# Patient Record
Sex: FEMALE | Race: WHITE
[De-identification: names, ages, dates, MRNs, and addresses within clinical notes are randomized per-mention and may not be internally consistent; named-entity substitution may affect disease eponyms.]

---

## 2019-09-05 ENCOUNTER — HOSPITAL ENCOUNTER (INPATIENT)
Dept: HOSPITAL 88 - ER | Age: 74
LOS: 13 days | Discharge: OTHER INSTITUTION | DRG: 870 | End: 2019-09-18
Attending: INTERNAL MEDICINE | Admitting: INTERNAL MEDICINE
Payer: MEDICARE

## 2019-09-05 VITALS — SYSTOLIC BLOOD PRESSURE: 113 MMHG | DIASTOLIC BLOOD PRESSURE: 78 MMHG

## 2019-09-05 VITALS — SYSTOLIC BLOOD PRESSURE: 116 MMHG | DIASTOLIC BLOOD PRESSURE: 72 MMHG

## 2019-09-05 VITALS — SYSTOLIC BLOOD PRESSURE: 111 MMHG | DIASTOLIC BLOOD PRESSURE: 77 MMHG

## 2019-09-05 VITALS — SYSTOLIC BLOOD PRESSURE: 128 MMHG | DIASTOLIC BLOOD PRESSURE: 92 MMHG

## 2019-09-05 VITALS — SYSTOLIC BLOOD PRESSURE: 145 MMHG | DIASTOLIC BLOOD PRESSURE: 94 MMHG

## 2019-09-05 VITALS — SYSTOLIC BLOOD PRESSURE: 140 MMHG | DIASTOLIC BLOOD PRESSURE: 94 MMHG

## 2019-09-05 VITALS — DIASTOLIC BLOOD PRESSURE: 63 MMHG | SYSTOLIC BLOOD PRESSURE: 105 MMHG

## 2019-09-05 VITALS — SYSTOLIC BLOOD PRESSURE: 129 MMHG | DIASTOLIC BLOOD PRESSURE: 87 MMHG

## 2019-09-05 VITALS — SYSTOLIC BLOOD PRESSURE: 123 MMHG | DIASTOLIC BLOOD PRESSURE: 60 MMHG

## 2019-09-05 VITALS — WEIGHT: 153.44 LBS | BODY MASS INDEX: 28.24 KG/M2 | HEIGHT: 62 IN

## 2019-09-05 VITALS — DIASTOLIC BLOOD PRESSURE: 90 MMHG | SYSTOLIC BLOOD PRESSURE: 123 MMHG

## 2019-09-05 VITALS — DIASTOLIC BLOOD PRESSURE: 60 MMHG | SYSTOLIC BLOOD PRESSURE: 123 MMHG

## 2019-09-05 VITALS — DIASTOLIC BLOOD PRESSURE: 77 MMHG | SYSTOLIC BLOOD PRESSURE: 111 MMHG

## 2019-09-05 VITALS — SYSTOLIC BLOOD PRESSURE: 150 MMHG | DIASTOLIC BLOOD PRESSURE: 84 MMHG

## 2019-09-05 DIAGNOSIS — Z16.12: ICD-10-CM

## 2019-09-05 DIAGNOSIS — E87.1: ICD-10-CM

## 2019-09-05 DIAGNOSIS — J30.2: ICD-10-CM

## 2019-09-05 DIAGNOSIS — I34.0: ICD-10-CM

## 2019-09-05 DIAGNOSIS — R56.9: ICD-10-CM

## 2019-09-05 DIAGNOSIS — A41.9: Primary | ICD-10-CM

## 2019-09-05 DIAGNOSIS — E78.5: ICD-10-CM

## 2019-09-05 DIAGNOSIS — G92: ICD-10-CM

## 2019-09-05 DIAGNOSIS — N17.9: ICD-10-CM

## 2019-09-05 DIAGNOSIS — I67.1: ICD-10-CM

## 2019-09-05 DIAGNOSIS — I48.91: ICD-10-CM

## 2019-09-05 DIAGNOSIS — K21.9: ICD-10-CM

## 2019-09-05 DIAGNOSIS — N10: ICD-10-CM

## 2019-09-05 DIAGNOSIS — B96.20: ICD-10-CM

## 2019-09-05 DIAGNOSIS — E87.6: ICD-10-CM

## 2019-09-05 DIAGNOSIS — B37.49: ICD-10-CM

## 2019-09-05 DIAGNOSIS — J18.9: ICD-10-CM

## 2019-09-05 DIAGNOSIS — I13.0: ICD-10-CM

## 2019-09-05 DIAGNOSIS — D75.82: ICD-10-CM

## 2019-09-05 DIAGNOSIS — E46: ICD-10-CM

## 2019-09-05 DIAGNOSIS — E79.0: ICD-10-CM

## 2019-09-05 DIAGNOSIS — E88.09: ICD-10-CM
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
## 2019-09-05 DIAGNOSIS — I82.612: ICD-10-CM

## 2019-09-05 DIAGNOSIS — E11.22: ICD-10-CM
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
## 2019-09-05 DIAGNOSIS — E83.51: ICD-10-CM

## 2019-09-05 DIAGNOSIS — I50.23: ICD-10-CM

## 2019-09-05 DIAGNOSIS — J44.9: ICD-10-CM

## 2019-09-05 DIAGNOSIS — N18.3: ICD-10-CM

## 2019-09-05 DIAGNOSIS — L89.152: ICD-10-CM

## 2019-09-05 DIAGNOSIS — Z79.82: ICD-10-CM

## 2019-09-05 DIAGNOSIS — I63.512: ICD-10-CM

## 2019-09-05 DIAGNOSIS — K72.00: ICD-10-CM

## 2019-09-05 DIAGNOSIS — E87.2: ICD-10-CM

## 2019-09-05 LAB
ALBUMIN SERPL-MCNC: 3 G/DL (ref 3.5–5)
ALBUMIN/GLOB SERPL: 0.8 {RATIO} (ref 0.8–2)
ALP SERPL-CCNC: 169 IU/L (ref 40–150)
ALT SERPL-CCNC: 168 IU/L (ref 0–55)
ANION GAP SERPL CALC-SCNC: 21.7 MMOL/L (ref 8–16)
ANISOCYTOSIS BLD QL SMEAR: (no result)
BACTERIA URNS QL MICRO: (no result) /HPF
BASOPHILS # BLD AUTO: 0 10*3/UL (ref 0–0.1)
BASOPHILS NFR BLD AUTO: 0.1 % (ref 0–1)
BILIRUB UR QL: NEGATIVE
BUN SERPL-MCNC: 78 MG/DL (ref 7–26)
BUN/CREAT SERPL: 26 (ref 6–25)
BURR CELLS BLD QL SMEAR: (no result)
CALCIUM SERPL-MCNC: 8.9 MG/DL (ref 8.4–10.2)
CHLORIDE SERPL-SCNC: 91 MMOL/L (ref 98–107)
CK MB SERPL-MCNC: 2.7 NG/ML (ref 0–5)
CK MB SERPL-MCNC: 4.7 NG/ML (ref 0–5)
CK MB SERPL-MCNC: 5.2 NG/ML (ref 0–5)
CK SERPL-CCNC: 105 IU/L (ref 29–168)
CK SERPL-CCNC: 87 IU/L (ref 29–168)
CK SERPL-CCNC: 88 IU/L (ref 29–168)
CLARITY UR: CLEAR
CO2 SERPL-SCNC: 19 MMOL/L (ref 22–29)
COLOR UR: YELLOW
CREAT UR-MCNC: 55.09 MG/DL (ref 47–110)
DEPRECATED INR PLAS: 1.47
DEPRECATED NEUTROPHILS # BLD AUTO: 11 10*3/UL (ref 2.1–6.9)
DEPRECATED RBC URNS MANUAL-ACNC: (no result) /HPF (ref 0–5)
EGFRCR SERPLBLD CKD-EPI 2021: 15 ML/MIN (ref 60–?)
EOSINOPHIL # BLD AUTO: 0 10*3/UL (ref 0–0.4)
EOSINOPHIL NFR BLD AUTO: 0.1 % (ref 0–6)
EPI CELLS URNS QL MICRO: (no result) /LPF
ERYTHROCYTE [DISTWIDTH] IN CORD BLOOD: 17.3 % (ref 11.7–14.4)
GLOBULIN PLAS-MCNC: 3.6 G/DL (ref 2.3–3.5)
GLUCOSE SERPLBLD-MCNC: 193 MG/DL (ref 74–118)
HCT VFR BLD AUTO: 36.5 % (ref 34.2–44.1)
HGB BLD-MCNC: 12.3 G/DL (ref 12–16)
KETONES UR QL STRIP.AUTO: NEGATIVE
LEUKOCYTE ESTERASE UR QL STRIP.AUTO: (no result)
LYMPHOCYTES # BLD: 1.5 10*3/UL (ref 1–3.2)
LYMPHOCYTES NFR BLD AUTO: 11 % (ref 18–39.1)
LYMPHOCYTES NFR BLD MANUAL: 16 % (ref 19–48)
MACROCYTES BLD QL SMEAR: (no result)
MCH RBC QN AUTO: 34.6 PG (ref 28–32)
MCHC RBC AUTO-ENTMCNC: 33.7 G/DL (ref 31–35)
MCV RBC AUTO: 102.5 FL (ref 81–99)
MONOCYTES # BLD AUTO: 0.8 10*3/UL (ref 0.2–0.8)
MONOCYTES NFR BLD AUTO: 5.5 % (ref 4.4–11.3)
MONOCYTES NFR BLD MANUAL: 10 % (ref 3.4–9)
NEUTS BAND NFR BLD MANUAL: 1 %
NEUTS SEG NFR BLD AUTO: 80.9 % (ref 38.7–80)
NEUTS SEG NFR BLD MANUAL: 73 % (ref 40–74)
NITRITE UR QL STRIP.AUTO: NEGATIVE
NRBC/RBC NFR BLD MANUAL: 5 %
PLAT MORPH BLD: NORMAL
PLATELET # BLD AUTO: 198 X10E3/UL (ref 140–360)
PLATELET # BLD EST: ADEQUATE 10*3/UL
POIKILOCYTOSIS BLD QL SMEAR: (no result)
POLYCHROMASIA BLD QL SMEAR: (no result)
POTASSIUM SERPL-SCNC: 4.7 MMOL/L (ref 3.5–5.1)
PROT UR QL STRIP.AUTO: (no result)
PROT UR-MCNC: 9.9 MG/DL (ref 1–14)
PROTHROMBIN TIME: 18.4 SECONDS (ref 11.9–14.5)
RBC # BLD AUTO: 3.56 X10E6/UL (ref 3.6–5.1)
RBC MORPH BLD: (no result)
SODIUM SERPL-SCNC: 127 MMOL/L (ref 136–145)
SODIUM UR-SCNC: < 20 MMOL/L
SP GR UR STRIP: >=1.03 (ref 1.01–1.02)
UROBILINOGEN UR STRIP-MCNC: 1 MG/DL (ref 0.2–1)
WBC #/AREA URNS HPF: (no result) /HPF (ref 0–5)

## 2019-09-05 PROCEDURE — 36415 COLL VENOUS BLD VENIPUNCTURE: CPT

## 2019-09-05 PROCEDURE — 86022 PLATELET ANTIBODIES: CPT

## 2019-09-05 PROCEDURE — 94640 AIRWAY INHALATION TREATMENT: CPT

## 2019-09-05 PROCEDURE — 86850 RBC ANTIBODY SCREEN: CPT

## 2019-09-05 PROCEDURE — 76604 US EXAM CHEST: CPT

## 2019-09-05 PROCEDURE — 95819 EEG AWAKE AND ASLEEP: CPT

## 2019-09-05 PROCEDURE — 86707 HEPATITIS BE ANTIBODY: CPT

## 2019-09-05 PROCEDURE — 84156 ASSAY OF PROTEIN URINE: CPT

## 2019-09-05 PROCEDURE — 87350 HEPATITIS BE AG IA: CPT

## 2019-09-05 PROCEDURE — 85025 COMPLETE CBC W/AUTO DIFF WBC: CPT

## 2019-09-05 PROCEDURE — 86706 HEP B SURFACE ANTIBODY: CPT

## 2019-09-05 PROCEDURE — 84100 ASSAY OF PHOSPHORUS: CPT

## 2019-09-05 PROCEDURE — 77001 FLUOROGUIDE FOR VEIN DEVICE: CPT

## 2019-09-05 PROCEDURE — 93880 EXTRACRANIAL BILAT STUDY: CPT

## 2019-09-05 PROCEDURE — 83735 ASSAY OF MAGNESIUM: CPT

## 2019-09-05 PROCEDURE — 36600 WITHDRAWAL OF ARTERIAL BLOOD: CPT

## 2019-09-05 PROCEDURE — 84550 ASSAY OF BLOOD/URIC ACID: CPT

## 2019-09-05 PROCEDURE — 80076 HEPATIC FUNCTION PANEL: CPT

## 2019-09-05 PROCEDURE — 94003 VENT MGMT INPAT SUBQ DAY: CPT

## 2019-09-05 PROCEDURE — 80053 COMPREHEN METABOLIC PANEL: CPT

## 2019-09-05 PROCEDURE — 36556 INSERT NON-TUNNEL CV CATH: CPT

## 2019-09-05 PROCEDURE — 76937 US GUIDE VASCULAR ACCESS: CPT

## 2019-09-05 PROCEDURE — 71250 CT THORAX DX C-: CPT

## 2019-09-05 PROCEDURE — 82550 ASSAY OF CK (CPK): CPT

## 2019-09-05 PROCEDURE — 82040 ASSAY OF SERUM ALBUMIN: CPT

## 2019-09-05 PROCEDURE — 83880 ASSAY OF NATRIURETIC PEPTIDE: CPT

## 2019-09-05 PROCEDURE — 83690 ASSAY OF LIPASE: CPT

## 2019-09-05 PROCEDURE — 83615 LACTATE (LD) (LDH) ENZYME: CPT

## 2019-09-05 PROCEDURE — 93971 EXTREMITY STUDY: CPT

## 2019-09-05 PROCEDURE — 87040 BLOOD CULTURE FOR BACTERIA: CPT

## 2019-09-05 PROCEDURE — 70450 CT HEAD/BRAIN W/O DYE: CPT

## 2019-09-05 PROCEDURE — 84300 ASSAY OF URINE SODIUM: CPT

## 2019-09-05 PROCEDURE — 80061 LIPID PANEL: CPT

## 2019-09-05 PROCEDURE — 87186 SC STD MICRODIL/AGAR DIL: CPT

## 2019-09-05 PROCEDURE — 83036 HEMOGLOBIN GLYCOSYLATED A1C: CPT

## 2019-09-05 PROCEDURE — 90962 ESRD SERV 1 VISIT P MO 20+: CPT

## 2019-09-05 PROCEDURE — 99285 EMERGENCY DEPT VISIT HI MDM: CPT

## 2019-09-05 PROCEDURE — 85610 PROTHROMBIN TIME: CPT

## 2019-09-05 PROCEDURE — 80185 ASSAY OF PHENYTOIN TOTAL: CPT

## 2019-09-05 PROCEDURE — 71045 X-RAY EXAM CHEST 1 VIEW: CPT

## 2019-09-05 PROCEDURE — 82948 REAGENT STRIP/BLOOD GLUCOSE: CPT

## 2019-09-05 PROCEDURE — 85730 THROMBOPLASTIN TIME PARTIAL: CPT

## 2019-09-05 PROCEDURE — 82140 ASSAY OF AMMONIA: CPT

## 2019-09-05 PROCEDURE — 86900 BLOOD TYPING SEROLOGIC ABO: CPT

## 2019-09-05 PROCEDURE — 87086 URINE CULTURE/COLONY COUNT: CPT

## 2019-09-05 PROCEDURE — 82553 CREATINE MB FRACTION: CPT

## 2019-09-05 PROCEDURE — 74470 X-RAY EXAM OF KIDNEY LESION: CPT

## 2019-09-05 PROCEDURE — 83605 ASSAY OF LACTIC ACID: CPT

## 2019-09-05 PROCEDURE — 85379 FIBRIN DEGRADATION QUANT: CPT

## 2019-09-05 PROCEDURE — 76700 US EXAM ABDOM COMPLETE: CPT

## 2019-09-05 PROCEDURE — 82150 ASSAY OF AMYLASE: CPT

## 2019-09-05 PROCEDURE — 94002 VENT MGMT INPAT INIT DAY: CPT

## 2019-09-05 PROCEDURE — 84443 ASSAY THYROID STIM HORMONE: CPT

## 2019-09-05 PROCEDURE — 86945 BLOOD PRODUCT/IRRADIATION: CPT

## 2019-09-05 PROCEDURE — 84484 ASSAY OF TROPONIN QUANT: CPT

## 2019-09-05 PROCEDURE — 81001 URINALYSIS AUTO W/SCOPE: CPT

## 2019-09-05 PROCEDURE — 93306 TTE W/DOPPLER COMPLETE: CPT

## 2019-09-05 PROCEDURE — 82570 ASSAY OF URINE CREATININE: CPT

## 2019-09-05 PROCEDURE — 97139 UNLISTED THERAPEUTIC PX: CPT

## 2019-09-05 PROCEDURE — 85384 FIBRINOGEN ACTIVITY: CPT

## 2019-09-05 PROCEDURE — 80048 BASIC METABOLIC PNL TOTAL CA: CPT

## 2019-09-05 PROCEDURE — 94660 CPAP INITIATION&MGMT: CPT

## 2019-09-05 PROCEDURE — 93005 ELECTROCARDIOGRAM TRACING: CPT

## 2019-09-05 PROCEDURE — 70551 MRI BRAIN STEM W/O DYE: CPT

## 2019-09-05 PROCEDURE — 82805 BLOOD GASES W/O2 SATURATION: CPT

## 2019-09-05 PROCEDURE — 86704 HEP B CORE ANTIBODY TOTAL: CPT

## 2019-09-05 PROCEDURE — 80202 ASSAY OF VANCOMYCIN: CPT

## 2019-09-05 RX ADMIN — METOPROLOL TARTRATE SCH MG: 25 TABLET, FILM COATED ORAL at 15:36

## 2019-09-05 RX ADMIN — SODIUM CHLORIDE PRN MG: 900 INJECTION INTRAVENOUS at 17:24

## 2019-09-05 RX ADMIN — CEFTRIAXONE SCH MLS/HR: 100 INJECTION, POWDER, FOR SOLUTION INTRAVENOUS at 05:15

## 2019-09-05 RX ADMIN — METOPROLOL TARTRATE SCH MG: 25 TABLET, FILM COATED ORAL at 23:34

## 2019-09-05 RX ADMIN — HEPARIN SODIUM SCH MLS/HR: 10000 INJECTION, SOLUTION INTRAVENOUS at 04:58

## 2019-09-05 RX ADMIN — BUMETANIDE SCH MG: 0.25 INJECTION INTRAMUSCULAR; INTRAVENOUS at 23:34

## 2019-09-05 RX ADMIN — SODIUM BICARBONATE SCH MG: 650 TABLET ORAL at 20:14

## 2019-09-05 RX ADMIN — Medication SCH MLS/HR: at 22:04

## 2019-09-05 RX ADMIN — FAMOTIDINE SCH MG: 10 INJECTION, SOLUTION INTRAVENOUS at 15:36

## 2019-09-05 RX ADMIN — BUMETANIDE SCH MG: 0.25 INJECTION INTRAMUSCULAR; INTRAVENOUS at 18:50

## 2019-09-05 RX ADMIN — FAMOTIDINE SCH MG: 10 INJECTION, SOLUTION INTRAVENOUS at 04:58

## 2019-09-05 RX ADMIN — Medication SCH MLS/HR: at 07:30

## 2019-09-05 NOTE — XMS REPORT
Patient Summary Document

                             Created on: 2019



Serena Lal KETURAH

External Reference #: [id number]

: 1945

Sex: Female



Demographics







                          Address                   [address] [place]  [address]

 

                          Home Phone                [phone]

 

                          Preferred Language        Unknown

 

                          Marital Status            Unknown

 

                          Orthodox Affiliation     Unknown

 

                          Race                      Unknown

 

                          Ethnic Group              Unknown





Author







                          Author                    [facility]

 

                          [facility]

 

                          Address                   Unknown

 

                          Phone                     Unavailable







Support







                Name            Relationship    Address         Phone

 

                    TRISHA ETIENNE      PRS                 [address]

[place]  [address]                      [phone]

 

                    ZOILA LAL    PRS                 [address]

[place]  [address]                     [phone]

 

                    TRISHA ETIENNE      PRS                 [address]

[place]  [address]                     [phone]

 

                    ZOILA LAL    PRS                 [address] [place]  [address]                     [phone]







Care Team Providers







                    Care Team Member Name    Role                Phone

 

                          Unavailable               Unavailable







Payers







             Payer Name    Policy Type    Policy Number    Effective Date    Expiration Date







Problems

This patient has no known problems.



Allergies, Adverse Reactions, Alerts







          Allergy Name    Allergy Type    Status    Severity    Reaction(s)    Onset Date    Inactive 

Date                      Treating Clinician        Comments

 

        No Known Allergies    DA      Active    U               2018 00:00:00                     

 

        No Known Allergies    DA      Active    U               2011 00:00:00                     







Medications

This patient has no known medications.



Results







           Test Description    Test Time    Test Comments    Text Results    Atomic Results    Result

 Comments

 

                SCR MAMM BILATERAL CARMEL CAD DIGITAL    2019 16:57:49                     - SCR MAMM BILATERAL

 CARMEL CAD DIGITALBILATERAL DIGITAL SCREENING MAMMOGRAM 3D/2D WITH CAD: 
2019CLINICAL: Asymptomatic.  Digital breast tomosynthesis was performed in 
addition to routine CC and MLO views.  Current mammographic images were 
evaluated by either a Peerform M-Vu or a InvisibleCRM ImageChecker CAD (computer aided 
detection system).  Comparison is made to exams dated  2018 mammogram, 2017 mammogram, and 2016 mammogram - [facility]-.  The tissue
of both breasts is predominantly fatty. The right breast appears larger.There is
vascular calcification in both breasts. There also is a benign calcification in 
the left breast.  No suspicious mass, architectural distortion, malignant type 
calcification, or lymph node abnormality detected.  Breast architecture is 
stable compared to prior exams.IMPRESSION: BENIGNThere is no mammographic 
evidence of malignancy. Resume annual screening mammography in one year.  Fabricio Byers M.D.          rb/:2019 16:57:49  Imaging Technologist: Macy CARNEY, [facility]-FWletter sent: BIRADS 1-2 Normal  Mammogram 
BI-RADS: 2 Benign                        

 

                    RAD, LEG, TIBIA     2017 23:57:00    Reason for exam:->LEG INJURYleft lower leg.Should

 this be performed at the bedside?->No    FINAL REPORT PATIENT ID:   [id number]  EXAMINATION::

 LEFT TIBIA AND FIBULA SERIES, 2 VIEWS INDICATION: LEG INJURY WITH PAIN 
IMPRESSION:  Degenerative osteoarthritic changes are noted at the knee involving
all 3 joint space compartments. A knee effusion cannot be excluded. Fullness of 
the soft tissues may patient's body habitus, swelling and/or contusion. No 
evidence of soft tissue emphysema.  Calcific atherosclerotic changes are noted. 
No definite evidence of acute fracture, however, distal fibula at the level the 
ankle is suboptimally visualized. A dedicated ankle series could be performed 
for further evaluation if clinically warranted. Results, limitations and 
diagnostic options discussed with Dr. Oconnor. Signed: Charles De La Torre MDReport 
Verified Date/Time:  2017 23:57:52 Reading Location: [address] Reading Room      Electronically signed by: CHARLES DE LA TORRE M.D. on 
2017 11:57 PM

## 2019-09-05 NOTE — NUR
Pt's IVs leaking and red. Pt refusing to allow anyone to stick for new IV. States "I came to 
the hospital to rest and not get poked, so no one is sticking me again"

## 2019-09-05 NOTE — NUR
patient arrived to icu room 195. sacral area/fold pink non blanchable.  stage 1 noted. wound 
care consult placed.

## 2019-09-05 NOTE — XMS REPORT
Clinical Summary

                             Created on: 2019



Serena Yang

External Reference #: [id number]

: 1945

Sex: Female



Demographics







                          Address                   [address]

[place]  [address]

 

                          Home Phone                [phone]

 

                          Preferred Language        English

 

                          Marital Status            Unknown

 

                          Episcopalian Affiliation     None

 

                          Race                      White

 

                          Ethnic Group              Non-





Author







                          Author                    [facility]

 

                          Address                   Unknown

 

                          Phone                     Unavailable







Support







                Name            Relationship    Address         Phone

 

                    Birdie Aggarwal KETURAH      [facility]                [address] 

[place]  [address]                      [phone]







Care Team Providers







                    Care Team Member Name    Role                Phone

 

                    Omar Buck    PCP                 [phone]







Allergies

No Known Allergies



Medications







                          End Date                  Status



              Medication     Sig          Dispensed     Refills      Start  



                                         Date  

 

                                                    Active



                     metoprolol (LOPRESSOR) 25     Take 20 mg by       0   



                           MG tablet                 mouth 2 (two)     



                                         times daily.     

 

                                                    Active



                     furosemide (LASIX) 20 MG     Take 20 mg by       0   



                           tablet                    mouth 2 (two)     



                                         times daily.     

 

                                                    Active



                     aspirin 81 MG EC tablet     Take 81 mg by       0   



                                         mouth daily.     

 

                                                    Active



                     omeprazole (PRILOSEC) 20     Take 20 mg by       0   



                           MG capsule                mouth daily.     

 

                                                    Active



                     hydrALAZINE (APRESOLINE)     Take 25 mg by       0   



                           25 MG tablet              mouth 3     



                                         (three) times     



                                         daily.     

 

                                                    Active



                     montelukast (SINGULAIR)     Take 10 mg by       0   



                           10 mg tablet              mouth     



                                         nightly.     







Active Problems





Not on file



Social History







                                        Date



                 Tobacco Use     Types           Packs/Day       Years Used 

 

                                         



                                         Never Smoker    

 

    



                                         Smokeless Tobacco: Never   



                                         Used   









   



                 Alcohol Use     Drinks/Week     oz/Week         Comments

 

   



                                         No   









 



                           Sex Assigned at Birth     Date Recorded

 

 



                                         Not on file 









                                        Industry



                           Job Start Date            Occupation 

 

                                        Not on file



                           Not on file               Not on file 









                                        Travel End



                           Travel History            Travel Start 

 





                                         No recent travel history available.







Last Filed Vital Signs

Not on file



Plan of Treatment





Not on file



Results

Not on fileafter 2018



Insurance







     



            Payer      Benefit     Subscriber ID     Type       Phone      Address



                                         Plan /    



                                         Group    

 

     



                 [employer]     [employer]           [address]        [place] [facility]  



                           [employer]                Contracted  



                                          ALL    









     



            Guarantor Name     Account     Relation to     Date of     Phone      Billing Address



                     Type                Patient             Birth  

 

     



            YangSerena harris Stephanie     Personal/F     Self       1945     [phone]     [address] child               (Home)              [place] [address]

## 2019-09-05 NOTE — DIAGNOSTIC IMAGING REPORT
EXAM: CT Chest WITHOUT intravenous contrast 9/5/2019 3:25 PM

INDICATION:  Lung mass

COMPARISON: Chest radiograph of 9/5/2019

TECHNIQUE:

Chest was scanned utilizing a multidetector helical scanner from the lung apex

through the level of the adrenal glands without administration of IV contrast.

Coronal and sagittal reformations were obtained. Routine protocol was

performed.



IV CONTRAST: None

RADIATION DOSE: Total DLP: 526.7 mGy*cm. Dose modulation, iterative

reconstruction, and/or weight based adjustment of the mA/kV was utilized to

reduce the radiation dose to as low as reasonably achievable. 

COMPLICATIONS: None



FINDINGS:



LINES/ TUBES: None.



LUNGS AND AIRWAYS:  The central airways are patent. Multifocal consolidative

opacities involve the lingula and dependent right lower lobe. 1.6 cm left lower

lobe nodular consolidation (series 2 image 76).



PLEURA: Moderate right pleural effusion. No pneumothorax.



HEART AND MEDIASTINUM: The thyroid gland is atrophic. No supraclavicular,

subpectoral, or axillary lymphadenopathy. Several prominent calcified and

noncalcified aortopulmonary window lymph nodes do not meet size criteria for

lymphadenopathy. Prominent calcified right hilar lymph nodes. Multichamber

cardiomegaly. Atherosclerotic calcifications involve the thoracic aorta, great

vessels, and coronary arteries. No pericardial effusion.  



UPPER ABDOMEN: Limited non-contrast views of the upper abdomen show no

abnormality within the visualized liver, spleen, pancreas, or kidneys. The

adrenal glands are normal.



BONES: No acute osseous injury. No suspicious lytic or blastic lesions.



SOFT TISSUES: Unremarkable.



IMPRESSION: 

Multifocal consolidative opacities involving the lingula and dependent right

lower lobe as well as a 1.6 cm left lower lobe nodular consolidation. In the

acute setting, findings may represent an infectious process. In a chronic

setting, malignancy would be a primary concern.



Multichamber cardiomegaly. Diffuse atherosclerotic calcifications including of

the coronary arteries.





Signed by: Kelechi Mendez MD on 9/5/2019 5:36 PM

## 2019-09-05 NOTE — NUR
PATIENT REPOSITIONED FOR COMFORT, NO COMPLAINTS OR NEEDS VOICED AT THIS TIME.

BREAKFAST TRAY DELIVERED AT THIS TIME

## 2019-09-05 NOTE — NUR
PT'S HR WAS ELEVATED IN TRIAGE; -155. PT DENIED HX OF AFIB. EKG OBTAINED. 
PT BROUGHT TO  1 IMMEDIATELY FOR TREATMENT.

## 2019-09-05 NOTE — NUR
PT AND FAMILY REQUESTING NO MORE BLOOD STICKS, THIS RN EXPLAINED REASON FOR BLOOD WORK. PT 
DAUGHTER REQUESTING A CENTRAL LINE, THIS RN EXPLAINED REASON WHY CENTRAL LINE IS NOT FIRST 
CHOICE EXPLAINED INCREASED RISK OF INFECTION AND BLEEDING. FAMILY VERBALIZED UNDERSTANDING 
AND PATIENT BUT STILL STATED THEY DID NOT WANT ANY MORE STICKS, THIS RN EXPLAINED THAT THERE 
WHERE MEDICATIONS THAT NEEDED TO BE GIVEN. ONE MORE RN ATTEMPTED IV AND WAS UNSUCCESSFUL 
FAMILY STATES THEY WANTED A CENTRAL LINE. WILL CONTACT MD

## 2019-09-05 NOTE — DIAGNOSTIC IMAGING REPORT
EXAM: US ABDOMEN COMPLETE

DATE: 9/5/2019 12:00 AM  

INDICATION:   Renal failure

COMPARISON: None 

TECHNIQUE: Transverse and longitudinal gray scale and color doppler sonographic

images of the upper abdomen were obtained. 



FINDINGS: 

There is no evidence of fluid or masses seen in the area of clinical concern in

the right lower quadrant. 

    

LIVER

12.8 cm in the right midclavicular line.

Normal echogenicity of the liver with normal contour, no masses.



SPLEEN

5.6 cm in maximum diameter.

Normal echogenicity, no masses.



GALLBLADDER

No gallbladder wall thickening, distension, stone, or pericholecystic fluid.

NEgative reported sonographic Veliz's sign. Gallbladder wall measures 2 mm



BILE DUCTS

No intra nor extra-hepatic biliary dilation.

Common bile duct measures 0.3cm



PANCREAS: Visualized portions are normal.



RIGHT KIDNEY: 8.4 cm

Echogenicity: Increased

Collecting System:  No hydronephrosis

Stones:  None

Cyst/Mass: None



LEFT KIDNEY: 8.2 cm

Echogenicity: Increased

Collecting System:  No hydronephrosis

Stones:  None

Cyst/Mass: None



VESSELS:

Aorta: Visualized portions are within normal size limits. Mid to distal aorta

not well-visualized due to overlying bowel gas.

Inferior Vena Cava: Visualized portions are normal

Main Portal Vein: 1.1 cm, normal size with hepatopetal flow.







FREE FLUID: None. Incidental note of small right pleural effusion.



IMPRESSION:

Mildly increased renal parenchymal echogenicity which can be seen with medical

renal disease.



No hydronephrosis or renal calculi.

 



Signed by: Kelechi Mendez MD on 9/5/2019 10:40 AM

## 2019-09-05 NOTE — HISTORY AND PHYSICAL
REASON FOR ADMISSION:  

1. Shortness of breath.

2. Peripheral edema.

3. Atrial fibrillation, RVR.

4. Urinary tract infection.

5. Acute renal failure.

6. Hyponatremia.

7. Elevated liver function tests.

 

HISTORY OF PRESENT ILLNESS:  The patient is a 74-year-old lady, well known to me, who

has been seen as an outpatient with significant complaints of peripheral edema, does not

improve with treatment with diuretic therapy.  So she then presents to the emergency

room with increased peripheral edema as well as increasing shortness of breath and was

found to have AFib with RVR, acute renal failure, hyponatremia, evidence of urinary

tract infection, elevated liver function test and worsening edema in her lower

extremities, so she is being admitted for further evaluation. 

 

PAST MEDICAL HISTORY:  Significant for hypertension.

 

MEDICATIONS:  See MAR.

 

ALLERGIES:  NONE.

 

SOCIAL HISTORY:  Nonsmoker, nondrinker.  Lives at home.

 

FAMILY HISTORY:  Hypertension.

 

PHYSICAL EXAMINATION:

VITAL SIGNS:  __________ blood pressure 124/76, sats 98%. 

GENERAL:  She is no apparent distress, lying in bed. 

NECK:  Supple.  No JVD. 

CARDIOVASCULAR:  Irregularly irregular and tachycardic. 

LUNGS:  Decreased breath sounds bilaterally. 

ABDOMEN:  Good bowel sounds.  Soft, nontender. 

EXTREMITIES:  Show 4+ edema with some seepage on the left leg. 

NEUROLOGIC:  Very nonfocal.

ASSESSMENT AND PLAN:  

1. Atrial fibrillation with rapid ventricular rate.  Continue with current care

__________.  Consult Cardiology. 

2. Possible congestive heart failure.  We will do echo.

3. Acute renal failure.  We will consult Dr. Porras.

4. Hyponatremia.  We will monitor with Dr. Porras.

5. Hypertension.  Continue current care and monitoring.

6. Urinary tract infection.  We will start her on some Rocephin.

7. Elevated liver function tests.  We will continue to monitor.  Hopefully, she has

possible congestion due to everything that is going on.  Please see hospital chart for

full details. 

 

 

 

 

______________________________

MD ZURDO Trinh/DAISY

D:  09/05/2019 04:54:21

T:  09/05/2019 11:36:21

Job #:  [id number]

## 2019-09-05 NOTE — CONSULTATION
DATE OF CONSULTATION:  2019  

 

HISTORY OF PRESENT ILLNESS:  This is a 74-year-old female with baseline serum creatinine

of 1.09 and most likely history of COPD, presented to the hospital with atrial

fibrillation RVR, shortness of breath, evidence of peripheral edema, and a suspected

urinary tract infection.  She was found to have hyponatremia, elevated serum creatinine,

which is why Renal was consulted.  History predominantly from the patient, partly from

daughter.  This is a 74-year-old white female, who has underlying history of

hypertension, lives with her son who is a chronic smoker.  Her   also

smoked.  She denies any history of kidney stone disease, kidney dysfunction.  I

discussed with Dr. Omar Buck.  Baseline serum creatinine as of  was 1.09.

She is mildly jaundiced. 

 

LABORATORY DATA:  Lab shows sodium 127, potassium 4.7, bicarbonate 19, anion gap 21.

BUN 78, creatinine 3 with glucose 193.  LFTs elevated.  Total bilirubin 2.8, ,

.  BNP 2140.  Troponin I 0.481.  Albumin 3, total protein 6.6, globulin 3.6.

Urinalysis shows specific gravity 1030 with pH of 5, 21 to 50 rbc, 21 to 50 wbc.  She

was recently treated for upper respiratory tract infection with antibiotic where the

dose was a bit too much and it was stopped.  She recently received a Kenalog shot. 

 

MEDICATIONS:  Her existing medications:

1. She is on ondansetron p.r.n. 

2. She is on metoprolol 25 p.o. q.6.

3. She received a couple of doses of digoxin.

4. She is on famotidine 20 IV q.12.

5. Ceftriaxone 1 g IV q.24.

6. Amiodarone IV infusion. 

At home:

1. She is on Omega-3.

2. Fish Oil.

3. Omeprazole.

4. Aspirin.

5. Furosemide.

6. Metoprolol.

 

SOCIAL HISTORY:  The patient does not smoke or drink.

 

PHYSICAL EXAMINATION:

GENERAL:  She is awake, alert, oriented x3 lying supine, tachypneic.  No apparent

respiratory distress. 

VITAL SIGNS:  Still with a blood pressure of 120/92, oxygen saturation 100%, respiratory

rate 20 with a heart rate of 119, irregularly irregular rhythm. 

HEAD AND NECK:  Jaundiced patient, conjunctivae appear icteric with oral mucosa moist. 

LUNGS:  Rales noted right lower 3rd more than left.  Occasional end-expiratory rhonchi. 

HEART:  S1, S2 audible.  Irregularly irregular rhythm ABDOMEN:  Otherwise soft,

nontender.  No apparent visceromegaly. 

EXTREMITIES:  Lower extremity, evidence of mild edema on ankle, about 1+ bilateral.

Redness of the pretibial area, bilateral.  Examination of the flanks and upper thighs

also shows evidence of 1+ pitting edema. 

IMPRESSION AND PLAN:  

1. Acute kidney injury with elevated white count, hyponatremia, high anion gap metabolic

acidosis, bicarbonate 19, evidence of congestive heart failure, abnormal liver function

tests.  Workup included chest x-ray, please see official report, shows small

cardiomegaly with vascular congestion.  Small right pleural effusion.  Abdominal

ultrasound shows 8.4 and 8.2 cm kidneys bilaterally with mild increased echogenicity.

Liver and spleen appeared within normal limits. 

2. Echocardiogram has not been done as yet.

3. Etiology of hyponatremia, multifactorial, must rule out underlying pneumonia as a

cause of syndrome of inappropriate antidiuretic hormone secretion and possible

hyponatremia.  Acidosis and kidney failure appears multifactorial.  She had elevated

LFTs, probably drug induced. 

She is an elderly female with baseline creatinine of 1.09.  Kidneys relatively small.

Urine shows evidence of fairly concentrated urine with specific gravity of 10/30.  Plan

on resuscitating intravascular volume with albumin 5%.  We will give 500 mL IV piggyback

over 6 hours.  I will start bicarbonate by mouth.  Renal workup including urine

protein/creatinine ratio; urine sodium, creatinine, kidney ultrasound, and serum uric

acid.  I will diurese with IV Bumex.  We will give 1 mg IV push one now and then q.6

hours.  Insert Jessica.  Strict I's and O's.  Discussed with family including daughter,

who is a registered nurse.  Please see orders. 

 

 

 

 

______________________________

MD SELENA Rogel/DAISY

D:  2019 15:26:06

T:  2019 20:11:46

Job #:  [id number]

## 2019-09-05 NOTE — DIAGNOSTIC IMAGING REPORT
EXAMINATION:  CHEST SINGLE (PORTABLE)   



COMPARISON:  Chest x-ray 9/20/2012



INDICATION: Leg wound, weakness 

^rapid heart rate

^[id number]

^0130

^Y  



DISCUSSION:

Frontal view of the chest obtained at 0141 hours. Patient's chin overlies the

chest.



HEART AND MEDIASTINUM:  The heart is enlarged. The aorta is tortuous. There are

calcifications in the right hilum.

  

LINES:  None.



LUNGS:  The lungs are diffusely hyperinflated. Vascular markings are prominent.

Hazy groundglass opacities in each lung. There is right basilar airspace

disease.



PLEURA:  Small right pleural effusion. No pneumothorax.



BONES AND SOFT TISSUES:  No focal osseous lesion. The soft tissues are normal.





IMPRESSION: 

Cardiomegaly and vascular congestion. A component of alveolar edema cannot be

excluded.



Small right pleural effusion with underlying atelectasis or pneumonia.



Signed by: Dr. Rudy Eller MD on 9/5/2019 2:15 AM

## 2019-09-05 NOTE — XMS REPORT
Clinical Summary

                             Created on: 2019



Serena Yang

External Reference #: [id number]

: 1945

Sex: Female



Demographics







                          Address                   [address]

[place]  [address]

 

                          Home Phone                [phone]

 

                          Preferred Language        English

 

                          Marital Status            Unknown

 

                          Orthodoxy Affiliation     None

 

                          Race                      White

 

                          Ethnic Group              Non-





Author







                          Author                    [facility]

 

                          Address                   Unknown

 

                          Phone                     Unavailable







Support







                Name            Relationship    Address         Phone

 

                    Birdie Aggarwal KETURAH      [facility]                [address] 

[place]  [address]                      [phone]







Care Team Providers







                    Care Team Member Name    Role                Phone

 

                    Omar Buck    PCP                 [phone]







Allergies

No Known Allergies



Medications







                          End Date                  Status



              Medication     Sig          Dispensed     Refills      Start  



                                         Date  

 

                                                    Active



                     metoprolol (LOPRESSOR) 25     Take 20 mg by       0   



                           MG tablet                 mouth 2 (two)     



                                         times daily.     

 

                                                    Active



                     furosemide (LASIX) 20 MG     Take 20 mg by       0   



                           tablet                    mouth 2 (two)     



                                         times daily.     

 

                                                    Active



                     aspirin 81 MG EC tablet     Take 81 mg by       0   



                                         mouth daily.     

 

                                                    Active



                     omeprazole (PRILOSEC) 20     Take 20 mg by       0   



                           MG capsule                mouth daily.     

 

                                                    Active



                     hydrALAZINE (APRESOLINE)     Take 25 mg by       0   



                           25 MG tablet              mouth 3     



                                         (three) times     



                                         daily.     

 

                                                    Active



                     montelukast (SINGULAIR)     Take 10 mg by       0   



                           10 mg tablet              mouth     



                                         nightly.     







Active Problems





Not on file



Social History







                                        Date



                 Tobacco Use     Types           Packs/Day       Years Used 

 

                                         



                                         Never Smoker    

 

    



                                         Smokeless Tobacco: Never   



                                         Used   









   



                 Alcohol Use     Drinks/Week     oz/Week         Comments

 

   



                                         No   









 



                           Sex Assigned at Birth     Date Recorded

 

 



                                         Not on file 









                                        Industry



                           Job Start Date            Occupation 

 

                                        Not on file



                           Not on file               Not on file 









                                        Travel End



                           Travel History            Travel Start 

 





                                         No recent travel history available.







Last Filed Vital Signs

Not on file



Plan of Treatment





Not on file



Results

Not on fileafter 2018



Insurance







     



            Payer      Benefit     Subscriber ID     Type       Phone      Address



                                         Plan /    



                                         Group    

 

     



                 [employer]     [employer]           [address]        [facility]  



                           [employer]                Contracted  



                                          ALL    









     



            Guarantor Name     Account     Relation to     Date of     Phone      Billing Address



                     Type                Patient             Birth  

 

     



            YangSerena harris Stephanie     Personal/F     Self       1945     [phone]     [address]               (Home)              [place] [address]

## 2019-09-05 NOTE — CONSULTATION
DATE OF CONSULTATION:  09/05/2019

 

Cardiology Consultation 

 

REASON FOR CONSULTATION:  Atrial fibrillation.

 

HISTORY OF PRESENT ILLNESS:  This is a 74-year-old woman with a history of

gastroesophageal reflux disease, seasonal allergies, and hypertension, who presented to

the emergency department with lower extremity swelling.  She states that she has been

treated for cellulitis as well.  She states that her symptoms have been progressively

worsening, associated with some shortness of breath, chest pressure, and palpitations.

Symptoms were mild-to-moderate in intensity, no other exacerbating or relieving factors.

 She was found to have atrial fibrillation with rapid ventricular response and was

admitted to the Intensive Care Unit. 

 

REVIEW OF SYSTEMS:

A 12-point review of system was conducted, is negative except as stated above in the HPI.

 

PAST MEDICAL HISTORY:  As stated above in the HPI.

 

PAST SURGICAL HISTORY:  None recent.

 

FAMILY HISTORY:  Noncontributory.

 

ALLERGIES:  NO KNOWN DRUG ALLERGIES.

 

MEDICATIONS:  See medication reconciliation form.

 

SOCIAL HISTORY:  No illicit drug, alcohol, or tobacco use.

 

PHYSICAL EXAMINATION:

VITAL SIGNS:  Temperature 97.8, heart rate is 123, respirations are 19, blood pressure

is 128/92, and oxygen saturation is 100% on 2 L nasal cannula. 

GENERAL:  She is a well-appearing elderly woman, lying comfortably in bed. 

HEAD:  Normocephalic, atraumatic. 

EYES:  The extraocular muscles are intact.  Conjunctivae are clear. 

NECK:  No JVD.  No bruits. 

CARDIOVASCULAR:  She is irregularly irregular, tachycardic.  No murmurs. 

LUNGS:  Diminished breath sounds at bases. 

ABDOMEN:  Soft, nontender, and nondistended. 

EXTREMITIES:  No clubbing or cyanosis.  There is 1+ edema. 

VASCULAR:  2+ pulses. 

SKIN:  Warm, dry with erythema in lower extremities. 

NEUROLOGIC:  No focal deficits noted.

LABORATORY DATA:  Reviewed.  White blood cell count 13.6, hemoglobin 12.3, platelets

198.  Sodium 127, creatinine 2.9.  Total bilirubin is 2.8, AST is 160, ALT is 168,

alkaline phosphatase is 169.  Troponin I 0.4, 0.5.  Normal CK and CK-MB.  BNP is 2140. 

 

Chest x-ray shows cardiomegaly with vascular congestion, small right pleural effusion. 

 

A 12-lead electrocardiogram showed atrial fibrillation with rapid ventricular response.

 

RECOMMENDATIONS:  The patient is already admitted and initiated on amiodarone and

heparin.  Her rate is still uncontrolled.  We will start scheduled oral metoprolol and

give one dose of IV digoxin.  Continue infectious treatments.  Antibiotics per primary

team.  She has been scheduled on Bumex intravenously for diuresis.  We will continue

this.  We will check a 2D echocardiogram.  The patient will require anticoagulation

given elevated CHADS-VASc score.  Continue heparin in the meantime. 

 

 

 

 

______________________________

DO HUBER Garcia/DAISY

D:  09/05/2019 17:02:17

T:  09/05/2019 19:42:24

Job #:  [id number]

## 2019-09-05 NOTE — NUR
RECEIVED ORDER FOR CENTRAL LINE WHEN THIS RN SPOKE TO FAMILY FIRST THEY AGREED TO CENTRAL 
LINE BUT WHEN THIS RN SPOKE TO THEM REGARDING CONSENT, DAUGHTER STATES " WELL I AM NOT 
SIGNING THAT I AM TOO TIRED AND HAVE NOT SLEPT," THIS RN EXPLAINED THAT PATIENT COULD NOT 
CONSENT BECAUSE SHE DID HAVE SOME INTERMITTENT CONFUSION. FAMILY STATES THEY HAVE CHANGED 
THEIR MIND AND THEY NEED TO DISCUSS IT FURTHER AT THIS TIME.

## 2019-09-05 NOTE — NUR
RECEIVED PATIENT IN ER 1, AWAITING BED AVAILABILITY IN ICU.  AWAKE AND ALERT, DENIES PAIN OR 
DISCOMFORT AT THIS TIME.  DAUGHTER AT BEDSIDE.  AMIODARONE INFUSING TO LEFT AC AT 1MG/MIN, 
HEPARIN INFUSING TO LEFT AC  U/HR.  BILATERAL LE WITH 3+ PITTING EDEMA, SKIN TIGHT AND 
SHINY, COOL TO TOUCH.  LEFT LEG WITH OPEN DRAINING WOUND TO ANTERIOR SURFACE, DRAINING CLEAR 
WITH YELLOW TINGED COLOR.  RIGHT LEG WITH INTACT BLISTER FILLED WITH CLEAR FLUID.  CARDIAC 
MONITOR SHOWS A-FIB WITH RATE 100-120.

## 2019-09-05 NOTE — NUR
PT DAUGHTER EXPRESSED CONCERNS INFORMED CHARGE RN, ER MANAGER AWARE AND WILL SPEAK TO 
PATIENT FAMILY MEMBER REGARDING CONCERNS.

## 2019-09-06 VITALS — SYSTOLIC BLOOD PRESSURE: 149 MMHG | DIASTOLIC BLOOD PRESSURE: 89 MMHG

## 2019-09-06 VITALS — DIASTOLIC BLOOD PRESSURE: 97 MMHG | SYSTOLIC BLOOD PRESSURE: 154 MMHG

## 2019-09-06 VITALS — DIASTOLIC BLOOD PRESSURE: 88 MMHG | SYSTOLIC BLOOD PRESSURE: 143 MMHG

## 2019-09-06 VITALS — SYSTOLIC BLOOD PRESSURE: 108 MMHG | DIASTOLIC BLOOD PRESSURE: 80 MMHG

## 2019-09-06 VITALS — SYSTOLIC BLOOD PRESSURE: 143 MMHG | DIASTOLIC BLOOD PRESSURE: 88 MMHG

## 2019-09-06 VITALS — DIASTOLIC BLOOD PRESSURE: 74 MMHG | SYSTOLIC BLOOD PRESSURE: 137 MMHG

## 2019-09-06 VITALS — DIASTOLIC BLOOD PRESSURE: 77 MMHG | SYSTOLIC BLOOD PRESSURE: 153 MMHG

## 2019-09-06 VITALS — DIASTOLIC BLOOD PRESSURE: 109 MMHG | SYSTOLIC BLOOD PRESSURE: 145 MMHG

## 2019-09-06 VITALS — SYSTOLIC BLOOD PRESSURE: 101 MMHG | DIASTOLIC BLOOD PRESSURE: 84 MMHG

## 2019-09-06 VITALS — SYSTOLIC BLOOD PRESSURE: 150 MMHG | DIASTOLIC BLOOD PRESSURE: 81 MMHG

## 2019-09-06 VITALS — DIASTOLIC BLOOD PRESSURE: 106 MMHG | SYSTOLIC BLOOD PRESSURE: 141 MMHG

## 2019-09-06 VITALS — SYSTOLIC BLOOD PRESSURE: 145 MMHG | DIASTOLIC BLOOD PRESSURE: 93 MMHG

## 2019-09-06 VITALS — DIASTOLIC BLOOD PRESSURE: 89 MMHG | SYSTOLIC BLOOD PRESSURE: 155 MMHG

## 2019-09-06 VITALS — SYSTOLIC BLOOD PRESSURE: 138 MMHG | DIASTOLIC BLOOD PRESSURE: 123 MMHG

## 2019-09-06 VITALS — DIASTOLIC BLOOD PRESSURE: 90 MMHG | SYSTOLIC BLOOD PRESSURE: 141 MMHG

## 2019-09-06 VITALS — DIASTOLIC BLOOD PRESSURE: 64 MMHG | SYSTOLIC BLOOD PRESSURE: 126 MMHG

## 2019-09-06 VITALS — SYSTOLIC BLOOD PRESSURE: 149 MMHG | DIASTOLIC BLOOD PRESSURE: 99 MMHG

## 2019-09-06 VITALS — SYSTOLIC BLOOD PRESSURE: 161 MMHG | DIASTOLIC BLOOD PRESSURE: 91 MMHG

## 2019-09-06 VITALS — SYSTOLIC BLOOD PRESSURE: 143 MMHG | DIASTOLIC BLOOD PRESSURE: 64 MMHG

## 2019-09-06 VITALS — DIASTOLIC BLOOD PRESSURE: 81 MMHG | SYSTOLIC BLOOD PRESSURE: 146 MMHG

## 2019-09-06 VITALS — DIASTOLIC BLOOD PRESSURE: 86 MMHG | SYSTOLIC BLOOD PRESSURE: 117 MMHG

## 2019-09-06 VITALS — DIASTOLIC BLOOD PRESSURE: 87 MMHG | SYSTOLIC BLOOD PRESSURE: 113 MMHG

## 2019-09-06 VITALS — SYSTOLIC BLOOD PRESSURE: 141 MMHG | DIASTOLIC BLOOD PRESSURE: 103 MMHG

## 2019-09-06 VITALS — SYSTOLIC BLOOD PRESSURE: 160 MMHG | DIASTOLIC BLOOD PRESSURE: 95 MMHG

## 2019-09-06 VITALS — SYSTOLIC BLOOD PRESSURE: 143 MMHG | DIASTOLIC BLOOD PRESSURE: 102 MMHG

## 2019-09-06 VITALS — SYSTOLIC BLOOD PRESSURE: 147 MMHG | DIASTOLIC BLOOD PRESSURE: 111 MMHG

## 2019-09-06 LAB
ALBUMIN SERPL-MCNC: 3.1 G/DL (ref 3.5–5)
ALBUMIN/GLOB SERPL: 1.1 {RATIO} (ref 0.8–2)
ALP SERPL-CCNC: 121 IU/L (ref 40–150)
ALT SERPL-CCNC: 150 IU/L (ref 0–55)
ANION GAP SERPL CALC-SCNC: 15.8 MMOL/L (ref 8–16)
BASOPHILS # BLD AUTO: 0 10*3/UL (ref 0–0.1)
BASOPHILS NFR BLD AUTO: 0.1 % (ref 0–1)
BUN SERPL-MCNC: 65 MG/DL (ref 7–26)
BUN/CREAT SERPL: 30 (ref 6–25)
CALCIUM SERPL-MCNC: 8.6 MG/DL (ref 8.4–10.2)
CHLORIDE SERPL-SCNC: 93 MMOL/L (ref 98–107)
CHOLEST SERPL-MCNC: 171 MD/DL (ref 0–199)
CHOLEST/HDLC SERPL: 9.5 {RATIO} (ref 3–3.6)
CO2 SERPL-SCNC: 24 MMOL/L (ref 22–29)
DEPRECATED NEUTROPHILS # BLD AUTO: 11.6 10*3/UL (ref 2.1–6.9)
EGFRCR SERPLBLD CKD-EPI 2021: 22 ML/MIN (ref 60–?)
EOSINOPHIL # BLD AUTO: 0 10*3/UL (ref 0–0.4)
EOSINOPHIL NFR BLD AUTO: 0.1 % (ref 0–6)
ERYTHROCYTE [DISTWIDTH] IN CORD BLOOD: 17.1 % (ref 11.7–14.4)
GLOBULIN PLAS-MCNC: 2.8 G/DL (ref 2.3–3.5)
GLUCOSE SERPLBLD-MCNC: 97 MG/DL (ref 74–118)
HCT VFR BLD AUTO: 34 % (ref 34.2–44.1)
HDLC SERPL-MSCNC: 18 MG/DL (ref 40–60)
HGB BLD-MCNC: 11.5 G/DL (ref 12–16)
LDLC SERPL CALC-MCNC: 134 MG/DL (ref 60–130)
LYMPHOCYTES # BLD: 1 10*3/UL (ref 1–3.2)
LYMPHOCYTES NFR BLD AUTO: 7.4 % (ref 18–39.1)
MCH RBC QN AUTO: 34 PG (ref 28–32)
MCHC RBC AUTO-ENTMCNC: 33.8 G/DL (ref 31–35)
MCV RBC AUTO: 100.6 FL (ref 81–99)
MONOCYTES # BLD AUTO: 0.8 10*3/UL (ref 0.2–0.8)
MONOCYTES NFR BLD AUTO: 5.6 % (ref 4.4–11.3)
NEUTS SEG NFR BLD AUTO: 85 % (ref 38.7–80)
PLATELET # BLD AUTO: 202 X10E3/UL (ref 140–360)
POTASSIUM SERPL-SCNC: 3.8 MMOL/L (ref 3.5–5.1)
RBC # BLD AUTO: 3.38 X10E6/UL (ref 3.6–5.1)
SODIUM SERPL-SCNC: 129 MMOL/L (ref 136–145)
TRIGL SERPL-MCNC: 95 MG/DL (ref 0–149)

## 2019-09-06 PROCEDURE — B5181ZA FLUOROSCOPY OF SUPERIOR VENA CAVA USING LOW OSMOLAR CONTRAST, GUIDANCE: ICD-10-PCS | Performed by: INTERNAL MEDICINE

## 2019-09-06 PROCEDURE — 02HV33Z INSERTION OF INFUSION DEVICE INTO SUPERIOR VENA CAVA, PERCUTANEOUS APPROACH: ICD-10-PCS | Performed by: INTERNAL MEDICINE

## 2019-09-06 RX ADMIN — METOPROLOL TARTRATE SCH MG: 50 TABLET, FILM COATED ORAL at 21:42

## 2019-09-06 RX ADMIN — SODIUM BICARBONATE SCH MG: 650 TABLET ORAL at 16:42

## 2019-09-06 RX ADMIN — SODIUM CHLORIDE TAB 1 GM SCH GM: 1 TAB at 21:01

## 2019-09-06 RX ADMIN — SODIUM CHLORIDE TAB 1 GM SCH GM: 1 TAB at 09:00

## 2019-09-06 RX ADMIN — BUMETANIDE SCH MG: 0.25 INJECTION INTRAMUSCULAR; INTRAVENOUS at 12:34

## 2019-09-06 RX ADMIN — SODIUM BICARBONATE SCH MG: 650 TABLET ORAL at 21:01

## 2019-09-06 RX ADMIN — FAMOTIDINE SCH MG: 10 INJECTION, SOLUTION INTRAVENOUS at 16:42

## 2019-09-06 RX ADMIN — HEPARIN SODIUM SCH MLS/HR: 10000 INJECTION, SOLUTION INTRAVENOUS at 04:00

## 2019-09-06 RX ADMIN — METOPROLOL TARTRATE SCH MG: 25 TABLET, FILM COATED ORAL at 05:55

## 2019-09-06 RX ADMIN — HEPARIN SODIUM SCH MLS/HR: 10000 INJECTION, SOLUTION INTRAVENOUS at 18:33

## 2019-09-06 RX ADMIN — BUMETANIDE SCH MG: 0.25 INJECTION INTRAMUSCULAR; INTRAVENOUS at 05:55

## 2019-09-06 RX ADMIN — AMIODARONE HYDROCHLORIDE SCH MG: 200 TABLET ORAL at 17:52

## 2019-09-06 RX ADMIN — SODIUM CHLORIDE TAB 1 GM SCH GM: 1 TAB at 16:42

## 2019-09-06 RX ADMIN — FAMOTIDINE SCH MG: 10 INJECTION, SOLUTION INTRAVENOUS at 04:12

## 2019-09-06 RX ADMIN — METOPROLOL TARTRATE SCH MG: 25 TABLET, FILM COATED ORAL at 12:46

## 2019-09-06 RX ADMIN — BUMETANIDE SCH MG: 0.25 INJECTION INTRAMUSCULAR; INTRAVENOUS at 17:52

## 2019-09-06 RX ADMIN — CEFTRIAXONE SCH MLS/HR: 100 INJECTION, POWDER, FOR SOLUTION INTRAVENOUS at 05:26

## 2019-09-06 RX ADMIN — SODIUM BICARBONATE SCH MG: 650 TABLET ORAL at 09:00

## 2019-09-06 RX ADMIN — BUMETANIDE SCH MG: 0.25 INJECTION INTRAMUSCULAR; INTRAVENOUS at 21:01

## 2019-09-06 NOTE — NUR
pt's left AC IV infiltrated. IV removed, pt states "she plans to show the MD her arm to show 
what we did to her"

## 2019-09-06 NOTE — DIAGNOSTIC IMAGING REPORT
PROCEDURE: Attempted Non-tunneled central venous catheter placement



Procedural Personnel

Attending physician(s): Kelechi Mendez MD

Fellow physician(s): None

Resident physician(s): None

Advanced practice provider(s): None



Pre-procedure diagnosis: Need for IV access

Post-procedure diagnosis: Same

Indication: Administration of intravenous medications

Additional clinical history: None



Complications: No immediate complications.



IMPRESSION:



Attempted insertion of right-sided non-tunneled triple lumen catheter. Wire

would not advance past the central veins. 



Plan: 

Repeat attempt with fluoroscopic and sonographic guidance.

_______________________________________________________________



PROCEDURE SUMMARY:

- Venous access with ultrasound guidance

- Attempted Non-tunneled central venous catheter insertion, aborted due to

inability to pass wire centrally.



PROCEDURE DETAILS:



Pre-procedure

Consent: Informed consent for the procedure including risks, benefits and

alternatives was obtained and time-out was performed prior to the procedure.

Preparation (MIPS): The site was prepared and draped using all elements of

maximal sterile barrier technique including sterile gloves, sterile gown, cap,

mask, large sterile sheet, sterile ultrasound probe cover, hand hygiene and

cutaneous antisepsis with 2% chlorhexidine. 

Medical reason for site preparation exception (MIPS): Not applicable



Anesthesia/sedation

Level of anesthesia/sedation: No sedation



Access

Local anesthesia was administered. The vessel was sonographically evaluated and

determined to be patent. Real time ultrasound was used to visualize needle

entry into the vessel and a permanent image    .

Vein accessed: Internal jugular vein

Access technique: Micropuncture set with 21 gauge needle



Catheter placement

Attempted Non-tunneled central venous catheter insertion, aborted due to

inability to pass wire centrally.



Contrast

Contrast agent: None



Radiation Dose

None



Additional Details

Additional description of procedure: None

Equipment details: None

Specimens removed: None

Estimated blood loss (mL): Less than 10

Standardized report: [email or web address]



Attestation

Signer name: Kelechi Mendez MD

I attest that I was present for the entire procedure. I reviewed the stored

images and agree with the report as written.



Signed by: Kelechi Mendez MD on 9/6/2019 4:21 PM

## 2019-09-06 NOTE — PROGRESS NOTE
DATE:  09/06/2019

 

Cardiology Progress Note 

 

SUBJECTIVE:  The patient feeling well.  Denies any palpitations.

 

OBJECTIVE:  VITAL SIGNS:  Temperature is 97.9, heart rate ranges from upper 90s to lower

120s, blood pressure is 137/74, and oxygen saturation 99% on 2 liters nasal cannula. 

GENERAL:  Well appearing, well built, in no apparent distress. 

CARDIOVASCULAR:  Irregularly irregular, tachycardic. 

LUNGS:  Diminished breath sounds at bases. 

ABDOMEN:  Soft, nontender, and nondistended. 

EXTREMITIES:  Erythema of the foreleg.

 

LABORATORY DATA:  Reviewed.  Hemoglobin 11.5 and white blood cell count 13.  Sodium 129

and creatinine 2.2.   and .  Troponin trended down to 0.374.  LDL is 134. 

 

CT of the chest showed multi chamber cardiomegaly.  Atherosclerotic calcifications.  No

pericardial effusion.  Multifocal consolidative opacities in the lingula and dependent

right lower lobe. 

 

IMPRESSION:  

1. Atrial fibrillation with rapid ventricular response.

2. Elevated troponins.

3. Acute on chronic kidney injury.

4. Acute liver injury.

5. Hyperlipidemia.

6. Hyponatremia.

7. Pneumonia.

8. Acute systolic congestive heart failure with ejection fraction of 35% to 40%.

9. Mitral regurgitation.

 

RECOMMENDATIONS:  We will change amiodarone drip to p.o. 200 mg b.i.d.  We will increase

metoprolol for better heart rate reduction.  If needed, can use intermittent doses of IV

digoxin, however, we will be careful as her creatinine is elevated.  Continue Bumex for

diuresis.  Heparin has been continued for anticoagulation.  Continue infectious

treatment per primary team.  The patient likely will need coronary angiography given

elevated troponins and reduced ejection fraction. 

 

 

 

 

______________________________

DO HUBER Garcia/MODL

D:  09/06/2019 17:36:27

T:  09/06/2019 18:00:14

Job #:  [id number]

## 2019-09-06 NOTE — NUR
DR. YESI TOM CALLED AND NOTIFIED OF RUN OF V-TACH EARLIER TODAY AT 16:55. PATIENT WAS 
ASYMPTOMATIC AND DENIED ANY CHEST PAIN OR DISCOMFORT. FAMILY AT BEDSIDE. LABS ORDERED FOR IN 
AM.

## 2019-09-06 NOTE — NUR
Dr. Buck at bedside, discussed central line insertion with patient and daughter, since pt 
does not like being poked for blood. Pt and daughter agreed to insert central line. Consent 
signed by patient with daughter at bedside.

## 2019-09-06 NOTE — NUR
WOUND CARE NURSE INITIAL CONSULTATION. 



74 YEAR OLD FEMALE ADMITTED TO [facility] WITH DX OF ATRIAL FIBRILLATION WITH RVR. HEAD 
TO TOE SKIN ASSESSMENT PERFORMED TODAY. PT PRESENTS WITH A 2X1.5CM RESOLVING BLISTER TO LEFT 
LOWER LEG. BLANCHABLE REDNESS TO BILATERAL HEELS AND LEFT BUTTOCK. THERE ARE NO OTHER AREAS 
OF CONCERN NOTED AT THIS TIME. NO S/S OF INFECTION.  



LABS: 



WBC: 13.6

ALB: .1

URINE AND BLOOD CULTURE RESULTS ARE PENDING AT THIS TIME. 



RECOMMENDATIONS:



APPLY ALLEVYN FOAM TO LEFT BUTTOCK AND BILATERAL HEELS DAILY.  

TURN EVERY 2 HOURS AND PRN

CONTINUE WITH BILATERAL HEEL PROTECTORS AND ADD PILLOW SUSPENSIONS.

PROVIDE PT WITH ALTERNATING LOW AIR LOSS MATTRESS. 



THANKS FOR THIS CONSULTATION 

-------------------------------------------------------------------------------

Addendum: 09/06/19 at 1504 by Kelsie Hackett RN

-------------------------------------------------------------------------------

Amended: Links added.

## 2019-09-06 NOTE — NUR
Pt refusing to let me change diaper and pads underneath. pt educated about skin breakdown. 
Pt still refusing. Gown changed.

## 2019-09-07 VITALS — SYSTOLIC BLOOD PRESSURE: 136 MMHG | DIASTOLIC BLOOD PRESSURE: 80 MMHG

## 2019-09-07 VITALS — DIASTOLIC BLOOD PRESSURE: 84 MMHG | SYSTOLIC BLOOD PRESSURE: 116 MMHG

## 2019-09-07 VITALS — DIASTOLIC BLOOD PRESSURE: 97 MMHG | SYSTOLIC BLOOD PRESSURE: 148 MMHG

## 2019-09-07 VITALS — DIASTOLIC BLOOD PRESSURE: 88 MMHG | SYSTOLIC BLOOD PRESSURE: 133 MMHG

## 2019-09-07 VITALS — SYSTOLIC BLOOD PRESSURE: 120 MMHG | DIASTOLIC BLOOD PRESSURE: 85 MMHG

## 2019-09-07 VITALS — DIASTOLIC BLOOD PRESSURE: 82 MMHG | SYSTOLIC BLOOD PRESSURE: 128 MMHG

## 2019-09-07 VITALS — DIASTOLIC BLOOD PRESSURE: 85 MMHG | SYSTOLIC BLOOD PRESSURE: 136 MMHG

## 2019-09-07 VITALS — SYSTOLIC BLOOD PRESSURE: 133 MMHG | DIASTOLIC BLOOD PRESSURE: 100 MMHG

## 2019-09-07 VITALS — DIASTOLIC BLOOD PRESSURE: 96 MMHG | SYSTOLIC BLOOD PRESSURE: 130 MMHG

## 2019-09-07 VITALS — DIASTOLIC BLOOD PRESSURE: 85 MMHG | SYSTOLIC BLOOD PRESSURE: 120 MMHG

## 2019-09-07 VITALS — DIASTOLIC BLOOD PRESSURE: 87 MMHG | SYSTOLIC BLOOD PRESSURE: 113 MMHG

## 2019-09-07 VITALS — SYSTOLIC BLOOD PRESSURE: 100 MMHG | DIASTOLIC BLOOD PRESSURE: 75 MMHG

## 2019-09-07 VITALS — DIASTOLIC BLOOD PRESSURE: 75 MMHG | SYSTOLIC BLOOD PRESSURE: 142 MMHG

## 2019-09-07 VITALS — DIASTOLIC BLOOD PRESSURE: 72 MMHG | SYSTOLIC BLOOD PRESSURE: 111 MMHG

## 2019-09-07 VITALS — SYSTOLIC BLOOD PRESSURE: 112 MMHG | DIASTOLIC BLOOD PRESSURE: 83 MMHG

## 2019-09-07 VITALS — DIASTOLIC BLOOD PRESSURE: 119 MMHG | SYSTOLIC BLOOD PRESSURE: 130 MMHG

## 2019-09-07 VITALS — DIASTOLIC BLOOD PRESSURE: 90 MMHG | SYSTOLIC BLOOD PRESSURE: 144 MMHG

## 2019-09-07 VITALS — DIASTOLIC BLOOD PRESSURE: 97 MMHG | SYSTOLIC BLOOD PRESSURE: 129 MMHG

## 2019-09-07 VITALS — SYSTOLIC BLOOD PRESSURE: 124 MMHG | DIASTOLIC BLOOD PRESSURE: 101 MMHG

## 2019-09-07 VITALS — DIASTOLIC BLOOD PRESSURE: 95 MMHG | SYSTOLIC BLOOD PRESSURE: 137 MMHG

## 2019-09-07 VITALS — DIASTOLIC BLOOD PRESSURE: 57 MMHG | SYSTOLIC BLOOD PRESSURE: 112 MMHG

## 2019-09-07 VITALS — SYSTOLIC BLOOD PRESSURE: 148 MMHG | DIASTOLIC BLOOD PRESSURE: 105 MMHG

## 2019-09-07 VITALS — SYSTOLIC BLOOD PRESSURE: 140 MMHG | DIASTOLIC BLOOD PRESSURE: 89 MMHG

## 2019-09-07 VITALS — DIASTOLIC BLOOD PRESSURE: 78 MMHG | SYSTOLIC BLOOD PRESSURE: 130 MMHG

## 2019-09-07 VITALS — SYSTOLIC BLOOD PRESSURE: 95 MMHG | DIASTOLIC BLOOD PRESSURE: 68 MMHG

## 2019-09-07 LAB
ALBUMIN SERPL-MCNC: 2.7 G/DL (ref 3.5–5)
ALBUMIN/GLOB SERPL: 1 {RATIO} (ref 0.8–2)
ALP SERPL-CCNC: 103 IU/L (ref 40–150)
ALT SERPL-CCNC: 122 IU/L (ref 0–55)
ANION GAP SERPL CALC-SCNC: 13.1 MMOL/L (ref 8–16)
BASE EXCESS BLDA CALC-SCNC: 14 MMOL/L (ref -2–3)
BASE EXCESS BLDA CALC-SCNC: 17 MMOL/L (ref -2–3)
BASOPHILS # BLD AUTO: 0 10*3/UL (ref 0–0.1)
BASOPHILS NFR BLD AUTO: 0.2 % (ref 0–1)
BUN SERPL-MCNC: 45 MG/DL (ref 7–26)
BUN/CREAT SERPL: 28 (ref 6–25)
CALCIUM SERPL-MCNC: 8.6 MG/DL (ref 8.4–10.2)
CHLORIDE SERPL-SCNC: 90 MMOL/L (ref 98–107)
CO2 SERPL-SCNC: 38 MMOL/L (ref 22–29)
DEPRECATED NEUTROPHILS # BLD AUTO: 9.4 10*3/UL (ref 2.1–6.9)
DEPRECATED PHOSPHATE SERPL-MCNC: 2.9 MG/DL (ref 2.3–4.7)
EGFRCR SERPLBLD CKD-EPI 2021: 31 ML/MIN (ref 60–?)
EOSINOPHIL # BLD AUTO: 0 10*3/UL (ref 0–0.4)
EOSINOPHIL NFR BLD AUTO: 0.4 % (ref 0–6)
ERYTHROCYTE [DISTWIDTH] IN CORD BLOOD: 17.8 % (ref 11.7–14.4)
GLOBULIN PLAS-MCNC: 2.7 G/DL (ref 2.3–3.5)
GLUCOSE SERPLBLD-MCNC: 108 MG/DL (ref 74–118)
HCO3 BLDA-SCNC: 36 MMOL/L (ref 23–28)
HCO3 BLDA-SCNC: 39 MMOL/L (ref 23–28)
HCT VFR BLD AUTO: 36.2 % (ref 34.2–44.1)
HGB BLD-MCNC: 11.8 G/DL (ref 12–16)
LYMPHOCYTES # BLD: 0.9 10*3/UL (ref 1–3.2)
LYMPHOCYTES NFR BLD AUTO: 7.9 % (ref 18–39.1)
MAGNESIUM SERPL-MCNC: 1.8 MG/DL (ref 1.3–2.1)
MCH RBC QN AUTO: 34.1 PG (ref 28–32)
MCHC RBC AUTO-ENTMCNC: 32.6 G/DL (ref 31–35)
MCV RBC AUTO: 104.6 FL (ref 81–99)
MONOCYTES # BLD AUTO: 0.6 10*3/UL (ref 0.2–0.8)
MONOCYTES NFR BLD AUTO: 5.5 % (ref 4.4–11.3)
NEUTS SEG NFR BLD AUTO: 84.3 % (ref 38.7–80)
PCO2 BLDA: 125 MMHG (ref 80–105)
PCO2 BLDA: 132 MMHG (ref 80–105)
PCO2 BLDA: 35 MMHG (ref 41–51)
PCO2 BLDA: 40 MMHG (ref 41–51)
PH BLDA: 7.59 [PH] (ref 7.31–7.41)
PH BLDA: 7.61 [PH] (ref 7.31–7.41)
PLATELET # BLD AUTO: 207 X10E3/UL (ref 140–360)
POTASSIUM SERPL-SCNC: 3.1 MMOL/L (ref 3.5–5.1)
RBC # BLD AUTO: 3.46 X10E6/UL (ref 3.6–5.1)
SAO2 % BLDA: 99 % (ref 95–98)
SAO2 % BLDA: 99 % (ref 95–98)
SODIUM SERPL-SCNC: 138 MMOL/L (ref 136–145)

## 2019-09-07 PROCEDURE — 5A1955Z RESPIRATORY VENTILATION, GREATER THAN 96 CONSECUTIVE HOURS: ICD-10-PCS | Performed by: EMERGENCY MEDICINE

## 2019-09-07 PROCEDURE — 0BH17EZ INSERTION OF ENDOTRACHEAL AIRWAY INTO TRACHEA, VIA NATURAL OR ARTIFICIAL OPENING: ICD-10-PCS | Performed by: EMERGENCY MEDICINE

## 2019-09-07 RX ADMIN — SODIUM BICARBONATE SCH MG: 650 TABLET ORAL at 09:28

## 2019-09-07 RX ADMIN — MEROPENEM SCH MLS/HR: 1 INJECTION INTRAVENOUS at 21:55

## 2019-09-07 RX ADMIN — METOPROLOL TARTRATE SCH MG: 50 TABLET, FILM COATED ORAL at 14:32

## 2019-09-07 RX ADMIN — PROPOFOL SCH MLS/HR: 10 INJECTION, EMULSION INTRAVENOUS at 16:53

## 2019-09-07 RX ADMIN — METOPROLOL TARTRATE SCH MG: 50 TABLET, FILM COATED ORAL at 19:49

## 2019-09-07 RX ADMIN — AMIODARONE HYDROCHLORIDE SCH MG: 200 TABLET ORAL at 09:28

## 2019-09-07 RX ADMIN — SODIUM CHLORIDE TAB 1 GM SCH GM: 1 TAB at 09:28

## 2019-09-07 RX ADMIN — AMIODARONE HYDROCHLORIDE SCH MG: 200 TABLET ORAL at 17:00

## 2019-09-07 RX ADMIN — SODIUM CHLORIDE TAB 1 GM SCH GM: 1 TAB at 14:32

## 2019-09-07 RX ADMIN — METOPROLOL TARTRATE SCH MG: 50 TABLET, FILM COATED ORAL at 06:03

## 2019-09-07 RX ADMIN — BUMETANIDE SCH MG: 0.25 INJECTION INTRAMUSCULAR; INTRAVENOUS at 21:55

## 2019-09-07 RX ADMIN — CEFTRIAXONE SCH MLS/HR: 100 INJECTION, POWDER, FOR SOLUTION INTRAVENOUS at 04:57

## 2019-09-07 RX ADMIN — BUMETANIDE SCH MG: 0.25 INJECTION INTRAMUSCULAR; INTRAVENOUS at 09:28

## 2019-09-07 RX ADMIN — SODIUM CHLORIDE TAB 1 GM SCH GM: 1 TAB at 19:49

## 2019-09-07 RX ADMIN — FAMOTIDINE SCH MG: 10 INJECTION, SOLUTION INTRAVENOUS at 16:15

## 2019-09-07 RX ADMIN — PROPOFOL SCH MLS/HR: 10 INJECTION, EMULSION INTRAVENOUS at 22:21

## 2019-09-07 RX ADMIN — MEROPENEM SCH MLS/HR: 1 INJECTION INTRAVENOUS at 14:32

## 2019-09-07 RX ADMIN — PROPOFOL SCH MLS/HR: 10 INJECTION, EMULSION INTRAVENOUS at 20:34

## 2019-09-07 RX ADMIN — FAMOTIDINE SCH MG: 10 INJECTION, SOLUTION INTRAVENOUS at 04:57

## 2019-09-07 NOTE — DIAGNOSTIC IMAGING REPORT
EXAM: Limited chest ultrasound.



INDICATION:  Pleural effusion.  



COMPARISON: None 



TECHNIQUE: Limited ultrasound was performed of the bilateral chest to evaluate

for pleural effusion.



FINDINGS/IMPRESSION:

There is a small right pleural effusion and a trace left pleural effusion.



Signed by: Dr. Kellen Frazier MD on 9/7/2019 2:22 PM

## 2019-09-07 NOTE — CONSULTATION
DATE OF CONSULTATION:  09/07/2019

 

Neurology Consult Note 

 

HISTORY OF PRESENT ILLNESS:  Ms. Yang is a 74-year-old woman with past 
medical

history significant for hypertension, hyperlipidemia, congestive heart failure 
(possibly

newly diagnosed) and a left cerebral artery aneurysm, admitted to [facility] on September 5, 2019, with atrial fibrillation with rapid 
ventricular

response, acute kidney injury, hyponatremia, urinary tract infection, and 
elevated liver

enzymes. 

 

Ms. Yang presented to the emergency center at [facility] on

September 5, 2019, with worsening lower extremity edema and shortness of breath.
 Her

evaluation in the emergency center found the patient to be in atrial 
fibrillation with

rapid ventricular response, acute kidney injury, hyponatremia, urinary tract 
infection,

and elevated liver enzymes.  Ms. Yang was subsequently admitted to the 
intensive

care unit at [facility] for further evaluation and 
treatment of

the above symptoms. 

 

The patient's nurse went into Ms. Yang room to administer medications.  
Initially,

the nurse thought Ms. Yang was sleeping, until she noted twitching around 
both eyes.

 The nurse then witnessed the left side of the patient's face pulled towards the
right.

At this time, the patient was lying in a right lateral decubitus position.  
After the

left side of the face pulled towards the right, the patient's eyes opened, then 
rolled

back in her head.  Immediately afterwards, the patient was noted to have shaking
of the

entire face and head.  At this time, the left side of the face was no longer 
being

pulled towards the right-hand side.  It is unknown what the arms or legs were 
doing at

this time.  There was no appreciated tongue biting.  The patient has a Jessica 
catheter in

place, so it is unknown if there was urinary incontinence.  However, one nurse 
at the

bedside noted 1200 mL of urine from the Jessica catheter immediately following 
this event.

 There was possible bowel incontinence. 

 

The above described activity persisted for approximately 2 minutes, then 
spontaneously

ceased.  During this time, a rapid response was called. 

 

Immediately after the shaking ceased, the patient was limped and unresponsive to

peripheral noxious stimulation.  Ms. Yang had no gag at this time.  However,
several

seconds later, she did wiggle her toes when a tongue blade was moved over the 
soles of

the feet. 

 

Ms. Yang was given etomidate 20 mg IV and succinylcholine 100 mg IV and 
intubated

for airway protection.  A stat CT of the brain without contrast was performed.  
This

study did not reveal evidence of recent large territorial ischemia, hemorrhage, 
mass, or

mass effect.  A Neurology consultation was requested stat for further evaluation
and

treatment of the above event. 

 

REVIEW OF SYSTEMS:

Unable to obtain secondary to the patient being intubated.

 

PAST MEDICAL HISTORY:  Hypertension, hyperlipidemia, congestive heart failure, 
atrial

fibrillation, gastroesophageal reflux disease, history of deep venous 
thrombosis, left

cerebral artery aneurysm. 

 

PAST SURGICAL HISTORY:  The only prior surgical history noted is right hand 
surgery.

 

FAMILY MEDICAL HISTORY:  The only significant family medical history noted is

hypertension. 

 

SOCIAL HISTORY:  Ms. Yang is single and reportedly lives alone.  She is 
retired.

There is no documented tobacco, alcohol, or recreational drug use. 

 

HOME MEDICATIONS:  Aspirin 81 mg by mouth daily, Lasix 20 mg by mouth daily, 
metoprolol

25 mg by mouth twice daily, Singulair 10 mg by mouth daily, fish oil, and 
vitamin D3 one

soft gel by mouth daily, omeprazole 40 mg by mouth daily. 

 

HOSPITAL MEDICATIONS:  Amiodarone, Bumex, Pepcid, heparin, meropenem, 
metoprolol, Zofran.

 

ALLERGIES:  NO KNOWN DRUG ALLERGIES.  NO KNOWN FOOD ALLERGIES.  NO KNOWN 
ALLERGIES TO

LATEX.  NO KNOWN ALLERGIES TO IODINE OR OTHER CONTRAST MATERIAL. 

BODY AFTER ALLERGIES:  

 

PHYSICAL EXAMINATION:

VITAL SIGNS:  Height 61 inches, weight 155 pounds, BMI 29.3 kg/m2, blood 
pressure

148/105 mmHg, pulse 146 beats per minute, respiratory rate 22 breaths per 
minute, and

oxygen saturation 100% on mechanical ventilation with settings as follows:  
Oxygen

concentration 50%, PEEP 5 cm water, respiratory rate 12 breaths per minute, 
tidal volume

400 mL. 

GENERAL:  The patient is awake and alert, appears distressed, pulling at 
restraints.

Normal body habitus. 

HEENT:  Normocephalic, atraumatic.  Pupils are equal, round, and sluggishly 
reactive to

light.  Moist mucous membranes. 

NECK:  Supple.  No appreciable thyromegaly.  No appreciable carotid bruits. 

CARDIOVASCULAR:  S1, S2, tachycardic, irregular rhythm.  No murmurs, rubs, or 
gallops. 

RESPIRATORY:  Intubated, mechanically ventilated.  Clear to auscultation 
bilaterally.

No wheezes, rhonchi, or rales. 

EXTREMITIES:  The skin is warm and dry.  No clubbing or cyanosis.  1+ pretibial 
pitting

edema.  The posterior tibial and dorsalis pedis pulses are 1+ and symmetric. 

SKIN:  Multiple ecchymoses, abrasions, and open wounds over the arms and legs.  
The

patient also reportedly has sacral decubitus ulcer as well. 

NEUROLOGIC: 

Memory/Attention:  The patient is awake and alert, not following commands. 

Cranial Nerves:  Cranial nerve I - not tested.  Cranial nerve II, III, IV, and 
VI -

pupils are equal and round, react sluggishly to light (from 4 mm to 2 mm).  
Extraocular

movements are grossly intact.  No nystagmus.  The face appears symmetric.  The 
patient

opens her eyes and tracks to voice. 

Strength:  Bulk is diminished throughout.  Strength is grossly 5/5 in both arms 
and both

legs.  Tone is normal. 

DTRs:  Deep tendon reflexes are absent and symmetric at the triceps, biceps,

brachioradialis, and Achilles.  Deep tendon reflexes are trace and symmetric at 
the

patellas.  Plantar responses are mute bilaterally. 

Sensation:  The patient withdraws both arms and both legs to peripheral noxious

stimulation. 

Cerebellar:  Unable to assess secondary to the patient being unable to follow 
commands. 

Gait:  Deferred. 

Speech:  Unable to assess secondary to the patient being intubated. 

Involuntary movements:  None. 

Pronator Drift:  As per motor exam.



LABORATORY DATA:  A comprehensive metabolic panel is significant for potassium 
of 3.1,

chloride of 90, carbon dioxide of 38, BUN of 45, creatinine of 1.61, estimated 
GFR of

31, BUN to creatinine ratio of 28, total bilirubin of 1.6, AST of 71, ALT of 
122, total

protein of 5.4, and albumin of 2.7.  Phosphorus 2.9.  Magnesium 1.8.  Creatine 
kinase

88, 105, 87.  CK-MB 2.70, 4.70, 5.20.  Troponin I 0.481, 0.587, 0.374.  Uric 
acid 17.6.

B-natriuretic peptide 2140.8.  Total cholesterol 171, triglycerides 95, LDL 
cholesterol

134, HDL cholesterol 18.  TSH 1.146.  CBC with differential and platelets 
reveals an

elevated white blood cell count of 11.15 with a left shift with 84.3% 
neutrophils, 7.9%

lymphocytes, 5.5% monocytes, 0.4% eosinophils, and 0.2% basophils.  The 
hemoglobin and

hematocrit are 11.8 and 36.2, respectively.  The platelet count is 207.  PTT 
49.7.

Urinalysis collected on September 5, 2019, revealed trace protein, trace 
leukocyte

esterase, 21 to 50 red blood cells, 21 to 50 white blood cells, and few urine 
epithelial

cells with few urine bacteria.  Urine random total protein 9.9.  Urine random 
sodium

less than 20.  Urine creatinine 55.09.  Protein to creatinine ratio 0.00.  Blood

cultures collected on September 5, 2019, revealed no growth at 48 hours.  Urine 
culture

collected on September 5, 2019, grew is Escherichia coli, ESBL. 

 

DIAGNOSTIC STUDIES:  

1. Electrocardiogram 09/05/2019:  Atrial fibrillation with rapid ventricular 
response.

2. Abdomen ultrasound 09/05/2019:  Mildly increased renal parenchymal 
echogenicity which

can be seen with medical renal disease.  No hydronephrosis or renal calculi. 

3. Chest x-ray 09/05/2019:  Cardiomegaly and vascular congestion.  A component 
of

alveolar edema cannot be excluded.  Small right pleural effusion with underlying

atelectasis or pneumonia. 

4. Chest CT 09/05/2019:  Multifocal consolidative opacities involving the 
lingula and

dependent right lower lobe as well as a 1.6 cm left lower lobe nodular 
consolidation.

In the acute setting, findings may represent an infectious process.  In a 
chronic

setting, malignancy would be a primary concern.  Multichamber cardiomegaly.  
Diffuse

atherosclerotic calcifications including the coronary arteries. 

5. Chest ultrasound 09/07/2019:  There is a small right pleural effusion and a 
trace

left pleural effusion. 

6. CT of the brain without contrast 09/07/2019:  On my review, there is no 
evidence of

recent or remote large territorial ischemia, hemorrhage, mass, or mass effect.  
Cerebral

volumes are appropriate for age.  There are findings compatible with moderate 
chronic

small vessel ischemic disease. 

7. Echocardiogram 09/06/2019:  Ejection fraction 25%.  Left atrial enlargement. 
Right

atrial enlargement.  Left ventricular enlargement.  Mild mitral and tricuspid

regurgitation. 

 

ASSESSMENT AND PLAN:  Ms. Yang is a 74-year-old woman with multiple medical

problems, who experienced a probable seizure on the afternoon of September 7, 2019.  The

patient's neurological examination is suspicious for postictal phase.  Ms. Yang

laboratory data and other diagnostic studies have been reviewed and are 
documented

above. 

 

RECOMMENDATIONS:  As follows:

1. An MRI of the brain without contrast stat is ordered.  However, there is no 
coverage

for MRI on September 7, 2019.  Therefore, the study will have to be performed at
0700 on

09/08/2019. 

2. An EEG awake and drowsy is ordered stat.  Awaiting the arrival of the EEG 
technician,

so the study may be performed and interpreted by myself. 

3. Ms. Yang will receive a dose of fosphenytoin 1400 mg intravenously once 
(loading

dose of approximately 20 mg/kg). 

4. A total phenytoin level will be drawn with morning labs at 0500 on September 8, 2019.

5. The patient is intubated and agitated.  Sedate with propofol.

6. Defer treatment of the remaining medical comorbidities to the primary and 
other

services following the patient. 

 

Thank you for this consultation.  I will continue to follow the patient while 
she

remains in the hospital. 

 

TIME SPENT:  120 minutes.

 

 

 

 

______________________________

Cyndi Corona MD CP/DAISY

D:  09/07/2019 17:13:37

T:  09/07/2019 22:46:00

Job #:  [id number]

 

[email or web address]

## 2019-09-07 NOTE — DIAGNOSTIC IMAGING REPORT
Examination: CT head without contrast

Clinical Indication: Unresponsive.

Technique: Transaxial noncontrast images from the skull base through the vertex

were obtained. Sagittal and coronal reformatted images were done.

Dose modulation, iterative reconstruction, and/or weight based adjustment of

the mA/kV was utilized to reduce the radiation dose to as low as reasonably

achievable. 

Comparison: None.



Findings:



Scalp: No abnormalities.

Bones: Intact. No fractures.  No blastic or lytic lesions. 



Brain sulci: Appropriate for patient's age.

Ventricles: Normal in size and configuration.   No hydrocephalus.  .



Extra-axial space:

No abnormalities.



Parenchyma: 

There are confluent areas of low-attenuation within subcortical and

periventricular white matter, nonspecific, but could represent microvascular

ischemic disease.

No masses, hemorrhage, or acute or chronic cortical based vascular insults.



Suprasellar region: No abnormalities.

Craniocervical junction: The foramen magnum is patent.  No Chiari one

malformation.



Incidental findings: 

Atherosclerotic calcification of the cavernous and supraclinoid internal

carotid arteries.



Impression:



1.  No acute intracranial finding.



2.  Chronic microvascular ischemic change.



Signed by: Dr. Rashida Avila M.D. on 9/7/2019 4:01 PM

## 2019-09-07 NOTE — NUR
Nutrition Screen Note



RD Recommendation for Physician: Continue diet as ordered  



Plan of Care: RD following, monitoring for tolerance and adequacy



Nutrition reason for involvement:

Nutrition Risk Trigger - MST



Primary Diagnose(s): UTI, atrial fibrillation, Elevated liver function test 



PMH: HTN 



Ht:61 in 

Wt:155.06lb

BMI:29.3 kg/m2

IBW:105lb +/-10%



RD Assessment:

(9/7/2019) Chart reviewed. Labs and meds reviewed. Initial encounter with patient. Pt denies 
any nausea, vomiting, diarrhea, nor has any difficulty chewing or swallowing. Pt denies any 
wt changes. Pt was eating well PTA. Good Po intake currently. Pt states that she is lactose 
intolerant



Current Diet: Renal diet  



Malnutrition Evaluation (9/7/2019)

The patient does not meet criteria for a specified degree of malnutrition at this time. Will 
re-evaluate at follow-up as appropriate. 





Nutrition Care Level: Low





Signed: Patrick Rios RD, LD, [facility]

## 2019-09-07 NOTE — DIAGNOSTIC IMAGING REPORT
EXAMINATION:  CHEST SINGLE (PORTABLE)    



INDICATION: Confirmation of endotracheal tube.



COMPARISON:  Chest ultrasound 9/7/2019 and CT chest 9/5/2019.

     

FINDINGS:

TUBES and LINES:  Endotracheal tube terminates 1.7 cm above the diego. Right

IJ non tunneled central venous catheter terminates near the cavoatrial

junction.



LUNGS: Multifocal patchy opacities. Mild perihilar and interstitial opacities.



PLEURA:  No pleural effusion or pneumothorax. 



HEART AND MEDIASTINUM:  The cardiomediastinal silhouette is  enlarged. 



BONES AND SOFT TISSUES:  No acute osseous abnormality. 



UPPER ABDOMEN: No free air under the diaphragm.    



IMPRESSION: 

Lines and tubes as above. ET tube terminates 1.7 cm above the diego. No

evidence of pneumothorax. 



Multifocal opacities, which may reflect multifocal pneumonia and/or pulmonary

edema.



Signed by: Dr. Kellen Frazier MD on 9/7/2019 5:01 PM

## 2019-09-07 NOTE — CONSULTATION
DATE OF CONSULTATION:  09/07/2019  

 

REASON FOR CONSULTATION:  UTI with multidrug resistant.

 

HISTORY OF PRESENT ILLNESS:  This patient is a very pleasant 74-year-old white female,

who is currently in the Intensive Care Unit here.  The patient who has history of

hypertension, chronic kidney disease.  The patient comes in with shortness of breath.

She was diagnosed with atrial fibrillation.  She is also diagnosed with UTI.  The

patient also diagnosed with acute renal failure.  She is started on antibiotic.  I am

asked to see her.  The patient apparently was admitted on September 5, 2019.  She is

currently doing much better.  Her breathing is better. 

 

The patient who has history of gastroesophageal reflux disease, seasonal allergies,

hypertension, comes in with bilateral lower extremities edema, shortness of breath, not

feeling well.  The patient was admitted and she was seen by Cardiology.  She is

currently doing much better.  Has no complaints, but her urine culture was positive. 

 

PAST MEDICAL HISTORY:  As above.

 

PAST SURGICAL HISTORY:  As above.

 

ALLERGIES:  NKA.

 

SOCIAL HISTORY:  Denies any smoking, drug abuse or alcohol abuse.

 

FAMILY HISTORY:  Otherwise noncontributory.

 

REVIEW OF SYSTEMS:

At the present time, 

HEENT:  There is no headache, visual changes, or hearing changes. 

GI:  There is no nausea.  No vomiting.  No diarrhea. 

CARDIAC:  There is no arrhythmia. 

NEURO:  No seizure activity. 

SKIN:  There is no rash. 

 

All other symptoms within normal limits at present. 

 

__________ scan shows hyponatremia with elevated creatinine.

 

LABORATORY DATA:  Also reviewed.  Her urine culture showed E coli ESBL, but her lab

review when she first came white count 13.6, hemoglobin 12.  Sodium 129, came up to 138,

creatinine 2.2, came down to 1.6. 

 

MEDICATIONS:  She is currently on meropenem.

 

PHYSICAL EXAMINATION:

GENERAL:  She is currently alert, oriented, does not seem to be in acute distress. 

VITAL SIGNS:  Stable.  Currently afebrile. 

HEENT:  She is not icteric. 

NECK:  Supple. 

CHEST:  Clear. 

COR:  S1, S2.  No murmurs. 

ABDOMEN:  Soft.  Positive bowel sounds.  No tenderness. 

EXTREMITIES:  No edema. 

SKIN:  No rash.

IMPRESSION:  

1. Sepsis, on admission.

2. Urinary tract infection.

3. Pyelonephritis.

4. Acute kidney injury seems to be better, maybe underlying chronic kidney disease.

Renal is following. 

5. Hyponatremia, improving.

6. From Infectious Disease point of view, I agree with meropenem, plan is 14 days.

Recheck CBC.  Recheck Chem panel.  We will follow with you. 

 

 

 

 

______________________________

MD JEROD Oden/DAISY

D:  09/07/2019 15:08:21

T:  09/07/2019 17:36:35

Job #:  [id number]

## 2019-09-07 NOTE — NUR
LABORATORY DATA:  Lab shows sodium 127, potassium 4.7, bicarbonate 19, anion 
gap 21.

BUN 78, creatinine 3 with glucose 193.  LFTs elevated.  Total bilirubin 2.8, 
,

.  BNP 2140.  Troponin I 0.481.  Albumin 3, total protein 6.6, globulin 
3.6.

Urinalysis shows specific gravity 1030 with pH of 5, 21 to 50 rbc, 21 to 50 
wbc.  She

was recently treated for upper respiratory tract infection with antibiotic 
where the

dose was a bit too much and it was stopped.  She recently received a Kenalog 
shot. 

 

MEDICATIONS:  Her existing medications:

1. She is on ondansetron p.r.n. 

2. She is on metoprolol 25 p.o. q.6.

3. She received a couple of doses of digoxin.

4. She is on famotidine 20 IV q.12.

5. Ceftriaxone 1 g IV q.24.

6. Amiodarone IV infusion. 

At home:

1. She is on Omega-3.

2. Fish Oil.

3. Omeprazole.

4. Aspirin.

5. Furosemide.

6. Metoprolol.

 

SOCIAL HISTORY:  The patient does not smoke or drink.



[id number]

## 2019-09-07 NOTE — PROGRESS NOTE
DATE:  09/07/2019

 

Cardiology Progress Note 

 

SUBJECTIVE:  The patient had seizure earlier today and now intubated.

 

OBJECTIVE:  VITAL SIGNS:  Temperature afebrile, pulse 95, respiratory rate 20, blood

pressure 137/95, and saturating 100% on mechanical ventilation. 

GENERAL:  The patient is intubated and sedated. 

CARDIOVASCULAR:  Irregular rate and rhythm.  Tachycardic. 

LUNGS:  Diminished breath sounds at the bases.  Clear to auscultation otherwise. 

ABDOMEN:  Soft, nontender, and nondistended. 

EXTREMITIES:  Trace edema.  Warm and well perfused.

 

INPATIENT MEDICATIONS:  Reviewed.

 

LABORATORY DATA:  Reviewed.

 

TELEMETRY DATA:  Reviewed, shows atrial fibrillation with RVR.

 

ASSESSMENT AND PLAN:  

1. Atrial fibrillation with rapid ventricular response.

2. Elevated troponin.

3. Acute-on-chronic kidney injury.

4. Seizures.

5. Acute liver injury.

6. Hyperlipidemia.

7. Hyponatremia.

8. Pneumonia.

9. Acute systolic congestive heart failure, ejection fraction 35% to 40%.

10. Mitral regurgitation.

 

RECOMMENDATIONS:  The patient is now intubated, unable to take p.o. medicine.  We will

put her back on IV amiodarone p.r.n. hydralazine for elevated blood pressure.  Defer

workup of seizure to Neurology.  We will hold off on anticoagulation until cleared by

Neurology.  If neurologically recovers, when seems to stabilized, we will need a stress

test prior to discharge. 

 

Thank you for this consult.  We will continue to follow.

 

 

 

 

______________________________

MD DELVIN Stover/DAISY

D:  09/07/2019 20:30:20

T:  09/07/2019 21:10:39

Job #:  [id number]

## 2019-09-07 NOTE — CONSULTATION
DATE OF CONSULTATION:  09/07/2019

 

Pulmonary Consultation 

 

REASON FOR THE CONSULT:  Abnormal CT of the chest.

 

CHIEF COMPLAINT:  Shortness of breath.

 

HISTORY OF PRESENT ILLNESS:  Ms. Yang is a 74-year-old female.  She presented

initially to the emergency room on the 5th of September with a complaint of leg edema,

which did not improve with diuretic therapy as an outpatient, so she was sent to the

emergency room.  She has a history of atrial fibrillation with RVR and acute renal

failure.  She also had UTI when she came in.  When she came in, her creatinine was 2.99,

which came down with diuresis.  Nephrology is following the patient.  She underwent a CT

of the chest without contrast on the 5th, which I have reviewed the images showing areas

of consolidation in the lingular area and the left upper lobe.  It is somewhat nodular,

however, in acute setting, it is more than likely pneumonia versus evidence of fluid due

to heart failure. 

 

REVIEW OF SYSTEMS:

GENERAL:  Denies any fever or chills. 

HEAD:  Denies any head trauma. 

ENT:  Denies any earache. 

CVS:  Denies any chest pain. 

RESPIRATORY:  Shortness of breath. 

GI:  Denies any nausea or vomiting. 

 

The rest of the review of systems are negative except as in HPI.

 

PAST MEDICAL HISTORY:  Hypertension and hyperlipidemia.

 

PAST SURGICAL HISTORY:  None.

 

FAMILY AND SOCIAL HISTORY:  She does not drink, does not smoke.  Lives at home.

 

PHYSICAL EXAMINATION:

VITAL SIGNS:  Temperature 97.4, pulse of 93, blood pressure 120/85, respiratory rate of

18, and O2 saturation 96% on 2 L. 

HEENT:  Head is atraumatic and normocephalic. 

NECK:  Supple. 

CHEST:  Crackles on the bases and decreased air entry in the bases. 

HEART:  S1 and S2 audible and irregular. 

ABDOMEN:  Soft and nontender. 

EXTREMITIES:  No pedal edema now. 

NEUROLOGICAL:  She is awake and alert.  No focal neurologic deficit.

LABORATORY DATA:  White count of 11,000, was 13,000 when she came in, hemoglobin of

11.8, and platelets 207.  Chemistry is sodium 138, potassium 3.1, BUN 45, and creatinine

1.61.  She had a urinalysis, which showed 21 to 50 wbc's on admission and urine culture

grew out ESBL E. coli.  CT of the chest, I reviewed the films. 

 

ASSESSMENT/PLAN:  

1. A 74-year-old female came in with leg edema.  Ejection fraction is between 35% to 40%

with impaired systolic function.  She has heart failure.  The CT showing evidence of

areas of consolidation, which is few of them are nodular and it is likely a combination

of pneumonia along with the possibility of fluid, which is appearing as areas of

consolidation.  The patient is on diuretics.  She also has right-sided pleural effusion

as well, which is also sizable.  Plan, continue the patient on diuresis and treatment of

heart failure and pneumonia.  I will discontinue Rocephin and start the patient on IV

meropenem, as she has extended-spectrum beta-lactamases Escherichia coli and she will

need broader spectrum for pneumonia coverage. 

2. Ultrasound-guided thoracentesis of right pleural effusion sent for cytology as well.

3. She will definitely need a repeat CAT scan in 6 to 8 weeks to document the resolution

of these findings.  If there is persistent nodularity, then biopsy versus PET scan needs

to be considered.  This was explained to the patient in detail. 

4. Acute kidney injury.  Nephrology is following.  Case discussed with Dr. Porras.  The

patient is on diuretics. 

Critical care time spent 50 minutes. 

 

I thank Dr. Porras and Dr. Buck for this consult.

 

 

 

 

______________________________

MD CHAPARRO Gray/DAISY

D:  09/07/2019 10:54:48

T:  09/07/2019 17:36:58

Job #:  [id number]

## 2019-09-08 VITALS — SYSTOLIC BLOOD PRESSURE: 111 MMHG | DIASTOLIC BLOOD PRESSURE: 76 MMHG

## 2019-09-08 VITALS — DIASTOLIC BLOOD PRESSURE: 68 MMHG | SYSTOLIC BLOOD PRESSURE: 122 MMHG

## 2019-09-08 VITALS — SYSTOLIC BLOOD PRESSURE: 107 MMHG | DIASTOLIC BLOOD PRESSURE: 85 MMHG

## 2019-09-08 VITALS — DIASTOLIC BLOOD PRESSURE: 84 MMHG | SYSTOLIC BLOOD PRESSURE: 124 MMHG

## 2019-09-08 VITALS — DIASTOLIC BLOOD PRESSURE: 67 MMHG | SYSTOLIC BLOOD PRESSURE: 107 MMHG

## 2019-09-08 VITALS — DIASTOLIC BLOOD PRESSURE: 89 MMHG | SYSTOLIC BLOOD PRESSURE: 128 MMHG

## 2019-09-08 VITALS — DIASTOLIC BLOOD PRESSURE: 57 MMHG | SYSTOLIC BLOOD PRESSURE: 109 MMHG

## 2019-09-08 VITALS — DIASTOLIC BLOOD PRESSURE: 72 MMHG | SYSTOLIC BLOOD PRESSURE: 111 MMHG

## 2019-09-08 VITALS — SYSTOLIC BLOOD PRESSURE: 117 MMHG | DIASTOLIC BLOOD PRESSURE: 74 MMHG

## 2019-09-08 VITALS — SYSTOLIC BLOOD PRESSURE: 119 MMHG | DIASTOLIC BLOOD PRESSURE: 81 MMHG

## 2019-09-08 VITALS — DIASTOLIC BLOOD PRESSURE: 74 MMHG | SYSTOLIC BLOOD PRESSURE: 117 MMHG

## 2019-09-08 VITALS — SYSTOLIC BLOOD PRESSURE: 102 MMHG | DIASTOLIC BLOOD PRESSURE: 70 MMHG

## 2019-09-08 VITALS — SYSTOLIC BLOOD PRESSURE: 113 MMHG | DIASTOLIC BLOOD PRESSURE: 74 MMHG

## 2019-09-08 VITALS — SYSTOLIC BLOOD PRESSURE: 99 MMHG | DIASTOLIC BLOOD PRESSURE: 66 MMHG

## 2019-09-08 VITALS — DIASTOLIC BLOOD PRESSURE: 76 MMHG | SYSTOLIC BLOOD PRESSURE: 104 MMHG

## 2019-09-08 VITALS — SYSTOLIC BLOOD PRESSURE: 121 MMHG | DIASTOLIC BLOOD PRESSURE: 79 MMHG

## 2019-09-08 VITALS — DIASTOLIC BLOOD PRESSURE: 74 MMHG | SYSTOLIC BLOOD PRESSURE: 116 MMHG

## 2019-09-08 VITALS — DIASTOLIC BLOOD PRESSURE: 68 MMHG | SYSTOLIC BLOOD PRESSURE: 111 MMHG

## 2019-09-08 VITALS — DIASTOLIC BLOOD PRESSURE: 80 MMHG | SYSTOLIC BLOOD PRESSURE: 119 MMHG

## 2019-09-08 VITALS — DIASTOLIC BLOOD PRESSURE: 81 MMHG | SYSTOLIC BLOOD PRESSURE: 101 MMHG

## 2019-09-08 VITALS — SYSTOLIC BLOOD PRESSURE: 136 MMHG | DIASTOLIC BLOOD PRESSURE: 78 MMHG

## 2019-09-08 VITALS — SYSTOLIC BLOOD PRESSURE: 106 MMHG | DIASTOLIC BLOOD PRESSURE: 71 MMHG

## 2019-09-08 VITALS — DIASTOLIC BLOOD PRESSURE: 61 MMHG | SYSTOLIC BLOOD PRESSURE: 99 MMHG

## 2019-09-08 VITALS — DIASTOLIC BLOOD PRESSURE: 72 MMHG | SYSTOLIC BLOOD PRESSURE: 100 MMHG

## 2019-09-08 LAB
ALBUMIN SERPL-MCNC: 2.6 G/DL (ref 3.5–5)
ALBUMIN/GLOB SERPL: 0.9 {RATIO} (ref 0.8–2)
ALP SERPL-CCNC: 92 IU/L (ref 40–150)
ALT SERPL-CCNC: 89 IU/L (ref 0–55)
ANION GAP SERPL CALC-SCNC: 13.6 MMOL/L (ref 8–16)
BASOPHILS # BLD AUTO: 0 10*3/UL (ref 0–0.1)
BASOPHILS NFR BLD AUTO: 0.2 % (ref 0–1)
BUN SERPL-MCNC: 35 MG/DL (ref 7–26)
BUN/CREAT SERPL: 25 (ref 6–25)
CALCIUM SERPL-MCNC: 8.5 MG/DL (ref 8.4–10.2)
CHLORIDE SERPL-SCNC: 89 MMOL/L (ref 98–107)
CO2 SERPL-SCNC: 38 MMOL/L (ref 22–29)
DEPRECATED NEUTROPHILS # BLD AUTO: 10 10*3/UL (ref 2.1–6.9)
DEPRECATED PHOSPHATE SERPL-MCNC: 2 MG/DL (ref 2.3–4.7)
EGFRCR SERPLBLD CKD-EPI 2021: 37 ML/MIN (ref 60–?)
EOSINOPHIL # BLD AUTO: 0 10*3/UL (ref 0–0.4)
EOSINOPHIL NFR BLD AUTO: 0.2 % (ref 0–6)
ERYTHROCYTE [DISTWIDTH] IN CORD BLOOD: 18.3 % (ref 11.7–14.4)
GLOBULIN PLAS-MCNC: 3 G/DL (ref 2.3–3.5)
GLUCOSE SERPLBLD-MCNC: 116 MG/DL (ref 74–118)
HCT VFR BLD AUTO: 38.7 % (ref 34.2–44.1)
HGB BLD-MCNC: 12.5 G/DL (ref 12–16)
LYMPHOCYTES # BLD: 1 10*3/UL (ref 1–3.2)
LYMPHOCYTES NFR BLD AUTO: 8.6 % (ref 18–39.1)
MAGNESIUM SERPL-MCNC: 1.6 MG/DL (ref 1.3–2.1)
MCH RBC QN AUTO: 33.7 PG (ref 28–32)
MCHC RBC AUTO-ENTMCNC: 32.3 G/DL (ref 31–35)
MCV RBC AUTO: 104.3 FL (ref 81–99)
MONOCYTES # BLD AUTO: 0.7 10*3/UL (ref 0.2–0.8)
MONOCYTES NFR BLD AUTO: 5.9 % (ref 4.4–11.3)
NEUTS SEG NFR BLD AUTO: 83.2 % (ref 38.7–80)
PLATELET # BLD AUTO: 191 X10E3/UL (ref 140–360)
POTASSIUM SERPL-SCNC: 3.6 MMOL/L (ref 3.5–5.1)
RBC # BLD AUTO: 3.71 X10E6/UL (ref 3.6–5.1)
SODIUM SERPL-SCNC: 137 MMOL/L (ref 136–145)

## 2019-09-08 RX ADMIN — MEROPENEM SCH MLS/HR: 1 INJECTION INTRAVENOUS at 05:17

## 2019-09-08 RX ADMIN — BUMETANIDE SCH MG: 0.25 INJECTION INTRAMUSCULAR; INTRAVENOUS at 08:24

## 2019-09-08 RX ADMIN — MEROPENEM SCH MLS/HR: 1 INJECTION INTRAVENOUS at 22:03

## 2019-09-08 RX ADMIN — FAMOTIDINE SCH MG: 10 INJECTION, SOLUTION INTRAVENOUS at 16:30

## 2019-09-08 RX ADMIN — SODIUM CHLORIDE TAB 1 GM SCH GM: 1 TAB at 21:00

## 2019-09-08 RX ADMIN — PROPOFOL SCH MLS/HR: 10 INJECTION, EMULSION INTRAVENOUS at 05:12

## 2019-09-08 RX ADMIN — FAMOTIDINE SCH MG: 10 INJECTION, SOLUTION INTRAVENOUS at 05:11

## 2019-09-08 RX ADMIN — SODIUM CHLORIDE TAB 1 GM SCH GM: 1 TAB at 08:24

## 2019-09-08 RX ADMIN — METOPROLOL TARTRATE SCH MG: 50 TABLET, FILM COATED ORAL at 13:42

## 2019-09-08 RX ADMIN — BUMETANIDE SCH MG: 0.25 INJECTION INTRAMUSCULAR; INTRAVENOUS at 20:29

## 2019-09-08 RX ADMIN — SODIUM CHLORIDE TAB 1 GM SCH GM: 1 TAB at 14:03

## 2019-09-08 RX ADMIN — SODIUM CHLORIDE SCH MLS/HR: 900 INJECTION INTRAVENOUS at 20:30

## 2019-09-08 RX ADMIN — CASTOR OIL AND BALSAM, PERU SCH GM: 788; 87 OINTMENT TOPICAL at 09:28

## 2019-09-08 RX ADMIN — METOPROLOL TARTRATE SCH MG: 50 TABLET, FILM COATED ORAL at 22:00

## 2019-09-08 RX ADMIN — METOPROLOL TARTRATE SCH MG: 50 TABLET, FILM COATED ORAL at 05:17

## 2019-09-08 RX ADMIN — PHENYTOIN SODIUM SCH MG: 50 INJECTION INTRAMUSCULAR; INTRAVENOUS at 20:29

## 2019-09-08 RX ADMIN — MEROPENEM SCH MLS/HR: 1 INJECTION INTRAVENOUS at 13:42

## 2019-09-08 NOTE — PROGRESS NOTE
DATE:  09/08/2019  

 

SUBJECTIVE:  A 74-year-old female who is in ICU, status post intubation, status post

seizure, status post fosphenytoin yesterday by Neurology.  The patient is currently

intubated, daughter by bedside.  Extensive discussion has been made with daughter.  The

patient is stable at this time. 

 

OBJECTIVE:  VITAL SIGNS:  Temperature is 98.5, pulse of 99, blood pressure is 113/74,

and pulse oximetry 100%.  The patient is on mechanical ventilator. 

HEENT:  Normocephalic, atraumatic.  The patient has an ET tube in. 

CVS:  S1, S2.  Regular. 

ABDOMEN:  Nontender, nondistended. 

EXTREMITIES:  Positive for edema and marked amount of swelling with blistering of the

tissue. 

SKIN:  The patient also has been noted to have a stage II sacral ulcer present. 

 

Notes of the events yesterday made.  The patient had a seizure activity and was admitted

to ICU.  The patient was intubated and CT scan were done which was negative.  Dr. Corona, Neurology has seen the patient, has been given fosphenytoin loading dose for

seizure activity.  EEG was done and was negative. 

 

LABORATORY VALUES:  Today, white count 11.98, hemoglobin 12.5, platelet count of 191,

and lymphocyte was 8.6, slight left shift present.  The patient's coags within normal

limits at 26.4.  Chemistries; sodium is 137, potassium is 3.6, BUN of 35, creatinine is

1.38, EGFR of 37, total bilirubin of 1.4, ALT is 89, and AST is 41.  TSH is 1.46.

Toxicology; phenytoin is pending.  Urine was essentially negative for nitrites and

positive for trace leukocyte esterase. 

 

MICROBIOLOGY:  E coli grown out and sensitive to Merrem.

 

IMAGING STUDIES:  Brain CT has been discussed yesterday shows no acute intracranial

findings, chronic microvascular changes and a CT of the chest ultrasound was also done

which showed small right pleural effusion with trace right and left pleural effusion.

Chest x-ray shows multifocal opacities which may reflect multifocal pneumonia and/or

pulmonary edema. 

 

MEDICATIONS:  

1. Merrem q.8 hours.

2. Famotidine for GI prophylaxis.

3. Bumex 1 mg q.12 hours for diuresis.

4. Amiodarone.  She is on drip for atrial fibrillation.

5. Metoprolol 50 mg q.8 hours.

6. The patient is on lorazepam for sedation.

7. Hydralazine 10 mg q.6 hours.

8. Heparin has been stopped.

 

ASSESSMENT:  

1. Atrial fibrillation with rapid ventricular response.  We will continue with rapid

ventricular response.  The patient has congestive heart failure, acute on chronic.  The

patient's EF is 35-40%. 

2. Acute on chronic kidney injury.  The patient has been followed with Dr. Porras.

Diuresis has been maintained. 

3. The patient with seizure activity.  Fosphenytoin has been given to the patient.  EEG

negative.  Reason for seizure is still unknown. 

4. Hyperlipidemia.  We will continue to monitor the patient.

5. The patient's hyponatremia has resolved and hypokalemia has resolved too. 

 

Further recommendation per clinical course.  We will continue to monitor the patient.

Medicines reviewed.  Continue medications.  The patient is still intubated.  Dr. Huntley

is on consult and Dr. Live for Cardiology and Dr. Corona for Neurology.  Further

recommendation per clinical course and we will continue to monitor the patient very

closely.  Family has asked for a collateral transfer to the [facility].  We will

discuss with Dr. Buck, the attending. 

 

 

 

 

______________________________

MD JANA Garrido/DAISY

D:  09/08/2019 07:35:52

T:  09/08/2019 10:56:00

Job #:  [id number]

## 2019-09-08 NOTE — ELECTROENCEPHALOGRAM
DATE OF STUDY:  2019

 

REQUESTING PHYSICIAN:  

 

PROCEDURE:  EEG.

 

REQUESTING PHYSICIAN:  Cyndi Corona MD

 

PATIENT HISTORY:  This 74-year-old woman with a history of a single probable 
seizure is

having an EEG for evaluation of epileptiform activity.  The patient is taking 
the

following medications which might affect the EEG:  Propofol. 

 

TECHNIQUE:  This is a STAT, portable EEG, recorded digitally, using the 
International

10/20 electrode placement system, and done in the inpatient setting with the 
patient

sedated.  The EEG is adequate for interpretation. 

 

DESCRIPTION:  Well-organized, well-sustained, 2-3 hertz activity is best seen

symmetrically over the posterior head regions.  13-14 Hz activity is intermixed 
and 

is probably secondary to medication effect.  Occasional sharp wave discharges 
are 

seen originating in the left temporal region.  Sleep is not recorded.  Photic 

stimulation does produce a driving response.  Hyperventilation is not performed.


 

INTERPRETATION:  This EEG is abnormal due to the followin. Occasional epileptiform discharges originating from the left temporal region.

2. Diffuse slowing of background electrocortical activity compatible with severe

generalized encephalopathy. 



Clinical correlation is recommended.

 

 

 

 

______________________________

Cyndi Corona MD

 

CP/MODL

D:  2019 11:48:17

T:  2019 14:41:36

Job #:  [id number]

 

MTDD

## 2019-09-08 NOTE — DIAGNOSTIC IMAGING REPORT
EXAMINATION:  CHEST SINGLE (PORTABLE)    



INDICATION: Intubation. 



COMPARISON: Chest radiograph 9/7/2019. 

     

FINDINGS:

TUBES and LINES:  Endotracheal tube terminates 1.8 cm above the diego. Right

IJ non-tunneled central venous catheter terminates near the cavoatrial

junction.



LUNGS: Multifocal patchy opacities. Mild perihilar and interstitial opacities.



PLEURA:  No pleural effusion or pneumothorax. 



HEART AND MEDIASTINUM:  The cardiomediastinal silhouette is enlarged. 



BONES AND SOFT TISSUES:  No acute osseous abnormality. 



UPPER ABDOMEN: No free air under the diaphragm.    



IMPRESSION: 

Lines and tubes as above. ET tube terminates 1.8 cm above the diego. No

evidence of pneumothorax. 



Multifocal opacities, which may reflect multifocal pneumonia and/or pulmonary

edema.



Signed by: Dr. Kellen Frazier MD on 9/8/2019 9:10 AM

## 2019-09-08 NOTE — NUR
0730- Dr. Corona notified patient is intubated and sedated with propofol, however MRI needs 
to be completed. MD ordered Ativan x1 dose if needed for agitation. Dose may be repeated if 
necessary. 

0900-Patient transported to MRI. Ativan doses administered due to agitation and movement 
during MRI. Dr. Corona made aware that MRI is unable to be done. Images are appearing 
blurry caused by patient movement despite two doses of Ativan administered. MD stated MRI 
can be tried again tomorrow. Will continue to monitor the patient. 

-------------------------------------------------------------------------------

Addendum: 09/08/19 at 1213 by Cris Worthington RN

-------------------------------------------------------------------------------

1015-Dr. Porras's office called to inform MD of AM lab values. Page went unreturned.

1100-Patient's daughter Birdie is upset that MRI could not be completed today. Dr. Corona 
explained to family member that MRI will be tried again tomorrow with max sedation and 
anesthesia consulted. Patient's family member also requesting to transfer to [facility]. 
Dr. Corona explained that the patient does not require higher level of care to be 
transferred at this time. Daughter acknowledged physicians response.

## 2019-09-08 NOTE — NUR
Dr. Porras returned call, BMP results read to MD. Orders given to give magnesium and potassium 
phosphate. 

Dr. Live rounded, continue amiodarone drip. Will continue to monitor the patient.

## 2019-09-08 NOTE — NUR
Dr. Landon hernandez. Informed Dr. Navarrete of patient's family's request to transfer patient to 
[facility]. Dr. Navarrete did not want patient to transfer at this time.

## 2019-09-09 VITALS — DIASTOLIC BLOOD PRESSURE: 94 MMHG | SYSTOLIC BLOOD PRESSURE: 118 MMHG

## 2019-09-09 VITALS — SYSTOLIC BLOOD PRESSURE: 131 MMHG | DIASTOLIC BLOOD PRESSURE: 76 MMHG

## 2019-09-09 VITALS — DIASTOLIC BLOOD PRESSURE: 59 MMHG | SYSTOLIC BLOOD PRESSURE: 91 MMHG

## 2019-09-09 VITALS — SYSTOLIC BLOOD PRESSURE: 116 MMHG | DIASTOLIC BLOOD PRESSURE: 59 MMHG

## 2019-09-09 VITALS — SYSTOLIC BLOOD PRESSURE: 102 MMHG | DIASTOLIC BLOOD PRESSURE: 61 MMHG

## 2019-09-09 VITALS — DIASTOLIC BLOOD PRESSURE: 77 MMHG | SYSTOLIC BLOOD PRESSURE: 125 MMHG

## 2019-09-09 VITALS — DIASTOLIC BLOOD PRESSURE: 90 MMHG | SYSTOLIC BLOOD PRESSURE: 136 MMHG

## 2019-09-09 VITALS — SYSTOLIC BLOOD PRESSURE: 100 MMHG | DIASTOLIC BLOOD PRESSURE: 66 MMHG

## 2019-09-09 VITALS — DIASTOLIC BLOOD PRESSURE: 74 MMHG | SYSTOLIC BLOOD PRESSURE: 107 MMHG

## 2019-09-09 VITALS — SYSTOLIC BLOOD PRESSURE: 123 MMHG | DIASTOLIC BLOOD PRESSURE: 89 MMHG

## 2019-09-09 VITALS — SYSTOLIC BLOOD PRESSURE: 104 MMHG | DIASTOLIC BLOOD PRESSURE: 73 MMHG

## 2019-09-09 VITALS — DIASTOLIC BLOOD PRESSURE: 68 MMHG | SYSTOLIC BLOOD PRESSURE: 129 MMHG

## 2019-09-09 VITALS — DIASTOLIC BLOOD PRESSURE: 71 MMHG | SYSTOLIC BLOOD PRESSURE: 104 MMHG

## 2019-09-09 VITALS — SYSTOLIC BLOOD PRESSURE: 103 MMHG | DIASTOLIC BLOOD PRESSURE: 71 MMHG

## 2019-09-09 VITALS — DIASTOLIC BLOOD PRESSURE: 68 MMHG | SYSTOLIC BLOOD PRESSURE: 122 MMHG

## 2019-09-09 VITALS — DIASTOLIC BLOOD PRESSURE: 78 MMHG | SYSTOLIC BLOOD PRESSURE: 105 MMHG

## 2019-09-09 VITALS — SYSTOLIC BLOOD PRESSURE: 107 MMHG | DIASTOLIC BLOOD PRESSURE: 74 MMHG

## 2019-09-09 VITALS — DIASTOLIC BLOOD PRESSURE: 56 MMHG | SYSTOLIC BLOOD PRESSURE: 84 MMHG

## 2019-09-09 VITALS — DIASTOLIC BLOOD PRESSURE: 76 MMHG | SYSTOLIC BLOOD PRESSURE: 136 MMHG

## 2019-09-09 VITALS — DIASTOLIC BLOOD PRESSURE: 71 MMHG | SYSTOLIC BLOOD PRESSURE: 110 MMHG

## 2019-09-09 VITALS — DIASTOLIC BLOOD PRESSURE: 93 MMHG | SYSTOLIC BLOOD PRESSURE: 130 MMHG

## 2019-09-09 VITALS — DIASTOLIC BLOOD PRESSURE: 71 MMHG | SYSTOLIC BLOOD PRESSURE: 98 MMHG

## 2019-09-09 VITALS — DIASTOLIC BLOOD PRESSURE: 94 MMHG | SYSTOLIC BLOOD PRESSURE: 116 MMHG

## 2019-09-09 VITALS — DIASTOLIC BLOOD PRESSURE: 113 MMHG | SYSTOLIC BLOOD PRESSURE: 126 MMHG

## 2019-09-09 LAB
ALBUMIN SERPL-MCNC: 2.4 G/DL (ref 3.5–5)
ALBUMIN/GLOB SERPL: 0.8 {RATIO} (ref 0.8–2)
ALP SERPL-CCNC: 78 IU/L (ref 40–150)
ALT SERPL-CCNC: 65 IU/L (ref 0–55)
ANION GAP SERPL CALC-SCNC: 14.9 MMOL/L (ref 8–16)
BASOPHILS # BLD AUTO: 0 10*3/UL (ref 0–0.1)
BASOPHILS NFR BLD AUTO: 0.1 % (ref 0–1)
BUN SERPL-MCNC: 33 MG/DL (ref 7–26)
BUN/CREAT SERPL: 22 (ref 6–25)
CALCIUM SERPL-MCNC: 8.3 MG/DL (ref 8.4–10.2)
CHLORIDE SERPL-SCNC: 91 MMOL/L (ref 98–107)
CO2 SERPL-SCNC: 33 MMOL/L (ref 22–29)
DEPRECATED NEUTROPHILS # BLD AUTO: 11.4 10*3/UL (ref 2.1–6.9)
EGFRCR SERPLBLD CKD-EPI 2021: 34 ML/MIN (ref 60–?)
EOSINOPHIL # BLD AUTO: 0.1 10*3/UL (ref 0–0.4)
EOSINOPHIL NFR BLD AUTO: 0.7 % (ref 0–6)
ERYTHROCYTE [DISTWIDTH] IN CORD BLOOD: 18.6 % (ref 11.7–14.4)
GLOBULIN PLAS-MCNC: 3 G/DL (ref 2.3–3.5)
GLUCOSE SERPLBLD-MCNC: 112 MG/DL (ref 74–118)
HCT VFR BLD AUTO: 37.1 % (ref 34.2–44.1)
HGB BLD-MCNC: 12.2 G/DL (ref 12–16)
LYMPHOCYTES # BLD: 1.2 10*3/UL (ref 1–3.2)
LYMPHOCYTES NFR BLD AUTO: 8.5 % (ref 18–39.1)
MAGNESIUM SERPL-MCNC: 2.2 MG/DL (ref 1.3–2.1)
MCH RBC QN AUTO: 33.8 PG (ref 28–32)
MCHC RBC AUTO-ENTMCNC: 32.9 G/DL (ref 31–35)
MCV RBC AUTO: 102.8 FL (ref 81–99)
MONOCYTES # BLD AUTO: 1.1 10*3/UL (ref 0.2–0.8)
MONOCYTES NFR BLD AUTO: 7.5 % (ref 4.4–11.3)
NEUTS SEG NFR BLD AUTO: 81.6 % (ref 38.7–80)
PLATELET # BLD AUTO: 166 X10E3/UL (ref 140–360)
POTASSIUM SERPL-SCNC: 3.9 MMOL/L (ref 3.5–5.1)
RBC # BLD AUTO: 3.61 X10E6/UL (ref 3.6–5.1)
SODIUM SERPL-SCNC: 135 MMOL/L (ref 136–145)

## 2019-09-09 RX ADMIN — SODIUM CHLORIDE SCH MLS/HR: 900 INJECTION INTRAVENOUS at 21:31

## 2019-09-09 RX ADMIN — FAMOTIDINE SCH MG: 10 INJECTION, SOLUTION INTRAVENOUS at 04:08

## 2019-09-09 RX ADMIN — PHENYTOIN SODIUM SCH MG: 50 INJECTION INTRAMUSCULAR; INTRAVENOUS at 21:31

## 2019-09-09 RX ADMIN — METOPROLOL TARTRATE SCH MG: 50 TABLET, FILM COATED ORAL at 19:42

## 2019-09-09 RX ADMIN — METOPROLOL TARTRATE SCH MG: 50 TABLET, FILM COATED ORAL at 05:52

## 2019-09-09 RX ADMIN — METOPROLOL TARTRATE SCH MG: 50 TABLET, FILM COATED ORAL at 14:00

## 2019-09-09 RX ADMIN — BUMETANIDE SCH MG: 0.25 INJECTION INTRAMUSCULAR; INTRAVENOUS at 09:20

## 2019-09-09 RX ADMIN — Medication SCH MLS/HR: at 19:15

## 2019-09-09 RX ADMIN — PROPOFOL SCH MLS/HR: 10 INJECTION, EMULSION INTRAVENOUS at 05:55

## 2019-09-09 RX ADMIN — DEXTROSE AND SODIUM CHLORIDE SCH MLS/HR: 5; 900 INJECTION, SOLUTION INTRAVENOUS at 18:17

## 2019-09-09 RX ADMIN — MEROPENEM SCH MLS/HR: 1 INJECTION INTRAVENOUS at 20:46

## 2019-09-09 RX ADMIN — MEROPENEM SCH MLS/HR: 1 INJECTION INTRAVENOUS at 05:52

## 2019-09-09 RX ADMIN — CASTOR OIL AND BALSAM, PERU SCH GM: 788; 87 OINTMENT TOPICAL at 09:24

## 2019-09-09 RX ADMIN — FAMOTIDINE SCH MG: 10 INJECTION, SOLUTION INTRAVENOUS at 16:17

## 2019-09-09 RX ADMIN — SODIUM CHLORIDE TAB 1 GM SCH GM: 1 TAB at 19:42

## 2019-09-09 RX ADMIN — SODIUM CHLORIDE TAB 1 GM SCH GM: 1 TAB at 15:00

## 2019-09-09 RX ADMIN — FAMOTIDINE SCH MG: 10 INJECTION, SOLUTION INTRAVENOUS at 16:14

## 2019-09-09 RX ADMIN — SODIUM CHLORIDE TAB 1 GM SCH GM: 1 TAB at 09:00

## 2019-09-09 NOTE — NUR
0700 Spoke with Les in MRI; plan is to do MRI with [facility] at 1300.  Family made aware.  0800 
Patient turned; 0900 Dr Vital to bedside, Temp 99.6 and daughter stated, "because that tube 
should have been removed yesterday."  0930 Dr Huntley to bedside; 1000 Patient turned.

## 2019-09-09 NOTE — PROGRESS NOTE
DATE:  09/09/2019

 

Cardiology Progress Note 

 

SUBJECTIVE:  The patient remains intubated and sedated.

 

OBJECTIVE:  VITAL SIGNS:  Temperature 99.6 degrees, pulse 110, respiratory rate 24,

blood pressure 91/59, oxygen saturation 99% on mechanical ventilation. 

GENERAL:  Elderly woman, frail.  No acute distress.  Intubated and sedated. 

LUNGS:  Clear to auscultation bilaterally.  No wheeze or crackles. 

CARDIOVASCULAR:  Tachycardic, irregularly irregular.  No murmur. 

ABDOMEN:  Soft, nontender. 

EXTREMITIES:  No edema.

 

CARDIAC MEDICATIONS:  Bumex 1 mg IV q.12 hours, metoprolol 150 mg p.o. q.8 hours,

amiodarone drip. 

 

LABORATORY DATA:  WBC 13.93, hemoglobin 12.2, hematocrit 37.1, platelets 166.  Sodium

135, potassium 3.9, chloride 91, CO2 of 33, BUN 33, creatinine 1.49, AST 41, ALT 65.

Chest x-ray multifocal opacities, which may reflect multifocal pneumonia and/or

pulmonary edema. 

 

TELEMETRY:  Atrial fibrillation with rapid ventricular response.

 

IMPRESSION:  

1. Atrial fibrillation with rapid ventricular response.

2. Elevated troponin.

3. Acute on chronic kidney disease.

4. Seizure.

5. Acute liver injury.

6. Hyperlipidemia.

7. Hyponatremia.

8. Pneumonia.

9. Acute systolic heart failure, EF 35% to 40%.

10. Mitral regurgitation.

 

RECOMMENDATIONS:  Continue IV amiodarone for rate control.  Resume p.o. medications once

extubated.  However, blood pressure may limit titration to AV melva blocking agents,

currently undergoing evaluation by Neurology given her seizures.  Once evaluation is

complete and agreeable with Nephrology resume anticoagulation, given her atrial

fibrillation with RVR as well as elevated troponin.  She will need ischemic evaluation

prior to discharge when she recovers from her neurologic event.  Continue current

cardiac medications for now. 

 

Thank you for this consult.  We will continue to follow.

 

 

 

 

______________________________

Cecilia Vital MD

 

ABS/MODL

D:  09/09/2019 10:41:54

T:  09/09/2019 11:42:07

Job #:  [id number]

## 2019-09-09 NOTE — DIAGNOSTIC IMAGING REPORT
EXAMINATION: MRI of the brain without contrast.



HISTORY: Seizures, atrial fibrillation.

COMPARISON: None

TECHNIQUE: Pre-contrast: Sagittal T1; axial T1-IR, MPGR, DWI, FLAIR;

Post-contrast: axial, sagittal, and coronal T1.  Thin section coronals of the

temporal lobes: FLAIR, T2.  



FINDINGS:



Brain parenchyma: 

-Subtle ill-defined areas of cortical FLAIR and DWI hyperintensity in the left

inferior parietal lobule, likely related to subacute ischemic insult, less

likely areas of cortical dysplasia or neoplasm.

-Moderate to severe confluent periventricular, corona radiata and centrum

semiovale white matter T2 hyperintensities, most likely nonspecific chronic

microvascular ischemic changes.



     Mass: None.

     Encephalomalacia: No areas.

     Ischemic changes: As above

     Calcification/iron: No abnormal deposits.

     Hippocampi: No atrophy or gliosis.  Normal fornices.

     Vascular: No obvious vascular malformation.  Normal flow voids in major

arteries and veins.[

     Gray matter: As above



Other:

     Brain volume: Normal for age.

     Ventricles: No hydrocephalus or displacement. 

     Foramen Magnum: Unremarkable.

     Sella: Unremarkable.

     Skull: No focal lesions.

     Sinuses/mastoids: No significant inflammatory disease.



IMPRESSION:



1.  Subtle areas of FLAIR and DWI hyperintensity in the left inferior parietal

lobule cortex that may represent a subacute ischemic infarct as detailed above.



2.  No acute intracranial hemorrhage.



3.  Moderate chronic microvascular ischemic changes.



Signed by: Dr. Yamilet Woods M.D. on 9/9/2019 4:44 PM

## 2019-09-09 NOTE — DIAGNOSTIC IMAGING REPORT
PROCEDURE: Non-tunneled central venous catheter placement



Procedural Personnel

Attending physician(s): Kelechi Mendez MD

Fellow physician(s): None

Resident physician(s): None

Advanced practice provider(s): None



Pre-procedure diagnosis: Need for central venous access

Post-procedure diagnosis: Same

Indication: Administration of intravenous medications

Additional clinical history: None



Complications: No immediate complications.



IMPRESSION:



Insertion of right-sided non-tunneled triple lumen temporary central venous

catheter, with tip in the expected location of the cavoatrial junction.



Plan: 



The catheter may be used immediately.

_______________________________________________________________



PROCEDURE SUMMARY:

- Venous access with ultrasound guidance

- Non-tunneled central venous catheter insertion with fluoroscopic guidance

- Additional procedure(s): None



PROCEDURE DETAILS:



Pre-procedure

Consent: Informed consent for the procedure including risks, benefits and

alternatives was obtained and time-out was performed prior to the procedure.

Preparation (MIPS): The site was prepared and draped using all elements of

maximal sterile barrier technique including sterile gloves, sterile gown, cap,

mask, large sterile sheet, sterile ultrasound probe cover, hand hygiene and

cutaneous antisepsis with 2% chlorhexidine. 

Medical reason for site preparation exception (MIPS): Not applicable



Anesthesia/sedation

Level of anesthesia/sedation: No sedation

Anesthesia/sedation administered by: Independent trained observer under

attending supervision with continuous monitoring of the patient?s level of

consciousness and physiologic status



Access

Local anesthesia was administered. The vessel was sonographically evaluated and

determined to be patent. Real time ultrasound was used to visualize needle

entry into the vessel and a permanent image was stored.

Vein accessed: Internal jugular vein

Access technique: Micropuncture set with 21 gauge needle



Catheter placement

The access site was dilated and the catheter was placed into the vein over a

wire  under fluoroscopic guidance.  The catheter tip location was

fluoroscopically verified and a permanent image was stored.. A sterile dressing

was applied.

Catheter placed: Bard triple lumen central venous catheter

Catheter size (Macedonian): 7

Catheter length (cm): 15

Catheter flush: Normal saline

Catheter securement technique: Non-absorbable suture



Contrast

Contrast agent: None



Radiation Dose

Fluoroscopy time (minutes): 0.3  

Reference air kerma (mGy): 1.1 



Additional Details

Additional description of procedure: None

Equipment details: None

Specimens removed: None

Estimated blood loss (mL): Less than 10

Standardized report: [email or web address]



Attestation

Signer name: Kelechi Mendez MD

I attest that I was present for the entire procedure. I reviewed the stored

images and agree with the report as written.



Signed by: Kelechi Mendez MD on 9/9/2019 8:43 AM

## 2019-09-09 NOTE — DIAGNOSTIC IMAGING REPORT
PROCEDURE: Non-tunneled central venous catheter placement



Procedural Personnel

Attending physician(s): Kelechi Mendez MD

Fellow physician(s): None

Resident physician(s): None

Advanced practice provider(s): None



Pre-procedure diagnosis: Need for central venous access

Post-procedure diagnosis: Same

Indication: Administration of intravenous medications

Additional clinical history: None



Complications: No immediate complications.



IMPRESSION:



Insertion of right-sided non-tunneled triple lumen temporary central venous

catheter, with tip in the expected location of the cavoatrial junction.



Plan: 



The catheter may be used immediately.

_______________________________________________________________



PROCEDURE SUMMARY:

- Venous access with ultrasound guidance

- Non-tunneled central venous catheter insertion with fluoroscopic guidance

- Additional procedure(s): None



PROCEDURE DETAILS:



Pre-procedure

Consent: Informed consent for the procedure including risks, benefits and

alternatives was obtained and time-out was performed prior to the procedure.

Preparation (MIPS): The site was prepared and draped using all elements of

maximal sterile barrier technique including sterile gloves, sterile gown, cap,

mask, large sterile sheet, sterile ultrasound probe cover, hand hygiene and

cutaneous antisepsis with 2% chlorhexidine. 

Medical reason for site preparation exception (MIPS): Not applicable



Anesthesia/sedation

Level of anesthesia/sedation: No sedation

Anesthesia/sedation administered by: Independent trained observer under

attending supervision with continuous monitoring of the patient?s level of

consciousness and physiologic status



Access

Local anesthesia was administered. The vessel was sonographically evaluated and

determined to be patent. Real time ultrasound was used to visualize needle

entry into the vessel and a permanent image was stored.

Vein accessed: Internal jugular vein

Access technique: Micropuncture set with 21 gauge needle



Catheter placement

The access site was dilated and the catheter was placed into the vein over a

wire  under fluoroscopic guidance.  The catheter tip location was

fluoroscopically verified and a permanent image was stored.. A sterile dressing

was applied.

Catheter placed: Bard triple lumen central venous catheter

Catheter size (Georgian): 7

Catheter length (cm): 15

Catheter flush: Normal saline

Catheter securement technique: Non-absorbable suture



Contrast

Contrast agent: None



Radiation Dose

Fluoroscopy time (minutes): 0.3  

Reference air kerma (mGy): 1.1 



Additional Details

Additional description of procedure: None

Equipment details: None

Specimens removed: None

Estimated blood loss (mL): Less than 10

Standardized report: [email or web address]



Attestation

Signer name: Kelechi Mendez MD

I attest that I was present for the entire procedure. I reviewed the stored

images and agree with the report as written.



Signed by: Kelechi Mendez MD on 9/9/2019 8:43 AM

## 2019-09-09 NOTE — NUR
Report received. Assumed care. Assessment done. See interventions. Dr. Corona here.  Orders 
given. Spoke with family. Orally intubated with 7.5FR ETT secured at 24cm at the lip. Vent 
settings: , FIO2 40%, PRVC 22 & PEEP 5cm.

## 2019-09-09 NOTE — PROGRESS NOTE
DATE:  09/08/2019

 

Cardiology Progress Note 

 

SUBJECTIVE:  No major events overnight.  Remains intubated and sedated.

 

OBJECTIVE:  VITAL SIGNS:  Temperature afebrile, pulse 94, respiratory rate 18, blood

pressure 122/68, saturating 99% on mechanical ventilation. 

GENERAL:  Elderly female, intubated and sedated. 

CARDIOVASCULAR:  Regular rate and rhythm.  No murmurs, rubs, or gallops. 

LUNGS:  Clear to auscultation at the bases. 

ABDOMEN:  Soft, nontender, nondistended. 

NEURO and PSYCH:  Intubated and sedated.

 

INPATIENT MEDICATIONS:  Reviewed.

 

LABORATORY DATA:  Reviewed.

 

TELEMETRY DATA:  Reviewed, shows atrial fibrillation with rapid ventricular response.

 

ASSESSMENT/PLAN:  

1. Atrial fibrillation with rapid ventricular response.

2. Elevated troponin.

3. Acute on chronic kidney injury.

4. Seizures.

5. Acute liver injury.

6. Hyperlipidemia.

7. Hyponatremia.

8. Pneumonia.

9. Acute systolic congestive heart failure, ejection fraction 35% to 40%.

10. Mitral regurgitation.

 

RECOMMENDATIONS:  The patient is now intubated and is unable to take p.o. medicine.

Continue IV amiodarone for now for rate control.  Plan is for MRI and possible

extubation tomorrow.  Resume oral amiodarone once patient is able to take p.o.

Currently, patient is on anticoagulation given neurological issues, once cleared by

Neurology resume heparin drip given no ejection fraction and elevated troponin, patient

will need an ischemic workup prior to discharge once neurological issues resolve. 

 

Thank you for this consult.  We will continue to follow.

 

 

 

 

______________________________

MD DELVIN Stover/DAISY

D:  09/09/2019 00:50:40

T:  09/09/2019 02:52:34

Job #:  [id number]

## 2019-09-09 NOTE — DIAGNOSTIC IMAGING REPORT
PROCEDURE: Non-tunneled central venous catheter placement



Procedural Personnel

Attending physician(s): Kelechi Mendez MD

Fellow physician(s): None

Resident physician(s): None

Advanced practice provider(s): None



Pre-procedure diagnosis: Need for central venous access

Post-procedure diagnosis: Same

Indication: Administration of intravenous medications

Additional clinical history: None



Complications: No immediate complications.



IMPRESSION:



Insertion of right-sided non-tunneled triple lumen temporary central venous

catheter, with tip in the expected location of the cavoatrial junction.



Plan: 



The catheter may be used immediately.

_______________________________________________________________



PROCEDURE SUMMARY:

- Venous access with ultrasound guidance

- Non-tunneled central venous catheter insertion with fluoroscopic guidance

- Additional procedure(s): None



PROCEDURE DETAILS:



Pre-procedure

Consent: Informed consent for the procedure including risks, benefits and

alternatives was obtained and time-out was performed prior to the procedure.

Preparation (MIPS): The site was prepared and draped using all elements of

maximal sterile barrier technique including sterile gloves, sterile gown, cap,

mask, large sterile sheet, sterile ultrasound probe cover, hand hygiene and

cutaneous antisepsis with 2% chlorhexidine. 

Medical reason for site preparation exception (MIPS): Not applicable



Anesthesia/sedation

Level of anesthesia/sedation: No sedation

Anesthesia/sedation administered by: Independent trained observer under

attending supervision with continuous monitoring of the patient?s level of

consciousness and physiologic status



Access

Local anesthesia was administered. The vessel was sonographically evaluated and

determined to be patent. Real time ultrasound was used to visualize needle

entry into the vessel and a permanent image was stored.

Vein accessed: Internal jugular vein

Access technique: Micropuncture set with 21 gauge needle



Catheter placement

The access site was dilated and the catheter was placed into the vein over a

wire  under fluoroscopic guidance.  The catheter tip location was

fluoroscopically verified and a permanent image was stored.. A sterile dressing

was applied.

Catheter placed: Bard triple lumen central venous catheter

Catheter size (Telugu): 7

Catheter length (cm): 15

Catheter flush: Normal saline

Catheter securement technique: Non-absorbable suture



Contrast

Contrast agent: None



Radiation Dose

Fluoroscopy time (minutes): 0.3  

Reference air kerma (mGy): 1.1 



Additional Details

Additional description of procedure: None

Equipment details: None

Specimens removed: None

Estimated blood loss (mL): Less than 10

Standardized report: [email or web address]



Attestation

Signer name: Kelechi Mendez MD

I attest that I was present for the entire procedure. I reviewed the stored

images and agree with the report as written.



Signed by: Kelechi Mendez MD on 9/9/2019 8:43 AM

## 2019-09-09 NOTE — NUR
Pt evaluated on 9/7/19, but had rapid response called after and was intubated. D/t change in 
medical status, pt will be placed on PT hold. New PT will be needed to resume skilled PT 
when pt appropriate. Thank you. 

-------------------------------------------------------------------------------

Addendum: 09/09/19 at 0911 by SHER WRIGHT PTA

-------------------------------------------------------------------------------

Amended: Links added.

## 2019-09-10 VITALS — SYSTOLIC BLOOD PRESSURE: 111 MMHG | DIASTOLIC BLOOD PRESSURE: 69 MMHG

## 2019-09-10 VITALS — DIASTOLIC BLOOD PRESSURE: 89 MMHG | SYSTOLIC BLOOD PRESSURE: 154 MMHG

## 2019-09-10 VITALS — DIASTOLIC BLOOD PRESSURE: 56 MMHG | SYSTOLIC BLOOD PRESSURE: 95 MMHG

## 2019-09-10 VITALS — SYSTOLIC BLOOD PRESSURE: 113 MMHG | DIASTOLIC BLOOD PRESSURE: 78 MMHG

## 2019-09-10 VITALS — SYSTOLIC BLOOD PRESSURE: 131 MMHG | DIASTOLIC BLOOD PRESSURE: 97 MMHG

## 2019-09-10 VITALS — DIASTOLIC BLOOD PRESSURE: 67 MMHG | SYSTOLIC BLOOD PRESSURE: 118 MMHG

## 2019-09-10 VITALS — DIASTOLIC BLOOD PRESSURE: 85 MMHG | SYSTOLIC BLOOD PRESSURE: 112 MMHG

## 2019-09-10 VITALS — SYSTOLIC BLOOD PRESSURE: 103 MMHG | DIASTOLIC BLOOD PRESSURE: 73 MMHG

## 2019-09-10 VITALS — DIASTOLIC BLOOD PRESSURE: 85 MMHG | SYSTOLIC BLOOD PRESSURE: 140 MMHG

## 2019-09-10 VITALS — SYSTOLIC BLOOD PRESSURE: 108 MMHG | DIASTOLIC BLOOD PRESSURE: 75 MMHG

## 2019-09-10 VITALS — DIASTOLIC BLOOD PRESSURE: 89 MMHG | SYSTOLIC BLOOD PRESSURE: 135 MMHG

## 2019-09-10 VITALS — SYSTOLIC BLOOD PRESSURE: 89 MMHG | DIASTOLIC BLOOD PRESSURE: 67 MMHG

## 2019-09-10 VITALS — DIASTOLIC BLOOD PRESSURE: 113 MMHG | SYSTOLIC BLOOD PRESSURE: 138 MMHG

## 2019-09-10 VITALS — DIASTOLIC BLOOD PRESSURE: 66 MMHG | SYSTOLIC BLOOD PRESSURE: 109 MMHG

## 2019-09-10 VITALS — SYSTOLIC BLOOD PRESSURE: 96 MMHG | DIASTOLIC BLOOD PRESSURE: 58 MMHG

## 2019-09-10 VITALS — SYSTOLIC BLOOD PRESSURE: 134 MMHG | DIASTOLIC BLOOD PRESSURE: 87 MMHG

## 2019-09-10 VITALS — DIASTOLIC BLOOD PRESSURE: 65 MMHG | SYSTOLIC BLOOD PRESSURE: 108 MMHG

## 2019-09-10 VITALS — DIASTOLIC BLOOD PRESSURE: 67 MMHG | SYSTOLIC BLOOD PRESSURE: 101 MMHG

## 2019-09-10 VITALS — DIASTOLIC BLOOD PRESSURE: 64 MMHG | SYSTOLIC BLOOD PRESSURE: 104 MMHG

## 2019-09-10 VITALS — DIASTOLIC BLOOD PRESSURE: 86 MMHG | SYSTOLIC BLOOD PRESSURE: 124 MMHG

## 2019-09-10 VITALS — SYSTOLIC BLOOD PRESSURE: 110 MMHG | DIASTOLIC BLOOD PRESSURE: 79 MMHG

## 2019-09-10 VITALS — DIASTOLIC BLOOD PRESSURE: 105 MMHG | SYSTOLIC BLOOD PRESSURE: 135 MMHG

## 2019-09-10 VITALS — DIASTOLIC BLOOD PRESSURE: 89 MMHG | SYSTOLIC BLOOD PRESSURE: 142 MMHG

## 2019-09-10 VITALS — SYSTOLIC BLOOD PRESSURE: 108 MMHG | DIASTOLIC BLOOD PRESSURE: 59 MMHG

## 2019-09-10 VITALS — DIASTOLIC BLOOD PRESSURE: 109 MMHG | SYSTOLIC BLOOD PRESSURE: 130 MMHG

## 2019-09-10 LAB
ALBUMIN SERPL-MCNC: 2 G/DL (ref 3.5–5)
ALBUMIN/GLOB SERPL: 0.7 {RATIO} (ref 0.8–2)
ALP SERPL-CCNC: 68 IU/L (ref 40–150)
ALT SERPL-CCNC: 46 IU/L (ref 0–55)
ANION GAP SERPL CALC-SCNC: 11.2 MMOL/L (ref 8–16)
BUN SERPL-MCNC: 29 MG/DL (ref 7–26)
BUN/CREAT SERPL: 26 (ref 6–25)
CALCIUM SERPL-MCNC: 7.9 MG/DL (ref 8.4–10.2)
CHLORIDE SERPL-SCNC: 96 MMOL/L (ref 98–107)
CO2 SERPL-SCNC: 31 MMOL/L (ref 22–29)
EGFRCR SERPLBLD CKD-EPI 2021: 48 ML/MIN (ref 60–?)
GLOBULIN PLAS-MCNC: 2.7 G/DL (ref 2.3–3.5)
GLUCOSE SERPLBLD-MCNC: 134 MG/DL (ref 74–118)
POTASSIUM SERPL-SCNC: 3.2 MMOL/L (ref 3.5–5.1)
SODIUM SERPL-SCNC: 135 MMOL/L (ref 136–145)

## 2019-09-10 RX ADMIN — SODIUM CHLORIDE TAB 1 GM SCH GM: 1 TAB at 14:14

## 2019-09-10 RX ADMIN — FAMOTIDINE SCH MG: 10 INJECTION, SOLUTION INTRAVENOUS at 04:30

## 2019-09-10 RX ADMIN — Medication SCH MLS/HR: at 08:45

## 2019-09-10 RX ADMIN — MEROPENEM SCH MLS/HR: 1 INJECTION INTRAVENOUS at 08:32

## 2019-09-10 RX ADMIN — SODIUM CHLORIDE SCH MLS/HR: 900 INJECTION INTRAVENOUS at 20:55

## 2019-09-10 RX ADMIN — DEXTROSE AND SODIUM CHLORIDE SCH MLS/HR: 5; 900 INJECTION, SOLUTION INTRAVENOUS at 22:21

## 2019-09-10 RX ADMIN — CASTOR OIL AND BALSAM, PERU SCH GM: 788; 87 OINTMENT TOPICAL at 08:32

## 2019-09-10 RX ADMIN — AMPICILLIN SODIUM AND SULBACTAM SODIUM SCH MLS/HR: 1; .5 INJECTION, POWDER, FOR SOLUTION INTRAMUSCULAR; INTRAVENOUS at 15:30

## 2019-09-10 RX ADMIN — METOPROLOL TARTRATE SCH MG: 1 INJECTION, SOLUTION INTRAVENOUS at 23:55

## 2019-09-10 RX ADMIN — METOPROLOL TARTRATE SCH MG: 50 TABLET, FILM COATED ORAL at 19:09

## 2019-09-10 RX ADMIN — SODIUM CHLORIDE TAB 1 GM SCH GM: 1 TAB at 09:00

## 2019-09-10 RX ADMIN — METOPROLOL TARTRATE SCH MG: 50 TABLET, FILM COATED ORAL at 19:10

## 2019-09-10 RX ADMIN — SODIUM CHLORIDE TAB 1 GM SCH GM: 1 TAB at 19:09

## 2019-09-10 RX ADMIN — PHENYTOIN SODIUM SCH MG: 50 INJECTION INTRAMUSCULAR; INTRAVENOUS at 20:55

## 2019-09-10 RX ADMIN — FAMOTIDINE SCH MG: 10 INJECTION, SOLUTION INTRAVENOUS at 17:13

## 2019-09-10 RX ADMIN — DEXTROSE AND SODIUM CHLORIDE SCH MLS/HR: 5; 900 INJECTION, SOLUTION INTRAVENOUS at 13:33

## 2019-09-10 RX ADMIN — METOPROLOL TARTRATE SCH MG: 50 TABLET, FILM COATED ORAL at 04:38

## 2019-09-10 RX ADMIN — Medication SCH MLS/HR: at 12:30

## 2019-09-10 RX ADMIN — AMPICILLIN SODIUM AND SULBACTAM SODIUM SCH MLS/HR: 1; .5 INJECTION, POWDER, FOR SOLUTION INTRAMUSCULAR; INTRAVENOUS at 22:00

## 2019-09-10 RX ADMIN — METOPROLOL TARTRATE SCH MG: 1 INJECTION, SOLUTION INTRAVENOUS at 17:13

## 2019-09-10 RX ADMIN — METOPROLOL TARTRATE SCH MG: 1 INJECTION, SOLUTION INTRAVENOUS at 11:48

## 2019-09-10 RX ADMIN — METOPROLOL TARTRATE SCH MG: 50 TABLET, FILM COATED ORAL at 14:00

## 2019-09-10 NOTE — PROGRESS NOTE
DATE:  09/10/2019

 

Cardiology Progress Note 

 

SUBJECTIVE:  The patient remains intubated, but follows commands.  However, she remains

quite somnolent. 

 

OBJECTIVE:  VITAL SIGNS:  Temperature 97.9 degrees, pulse 92, respiratory rate 17, blood

pressure 134/87, oxygen saturation 100% on mechanical ventilation. 

GENERAL:  Elderly frail woman in no acute distress, intubated, follows commands, but is

quite somnolent. 

LUNGS:  Clear to auscultation bilaterally.  No wheezes or crackles. 

CARDIOVASCULAR:  Tachycardic, irregularly irregular.  No murmur. 

ABDOMEN:  Soft, nontender. 

EXTREMITIES:  No edema.

 

CARDIAC MEDICATIONS:  Heparin drip.

 

LABORATORY DATA:  Sodium 135, potassium 3.2, chloride 96, CO2 of 31, BUN 29, and

creatinine 1.1.  . 

 

TELEMETRY:  Atrial fibrillation with rapid ventricular response.

 

IMPRESSION:  

1. Atrial fibrillation with rapid ventricular response.

2. Elevated troponin.

3. Acute on chronic kidney disease, improving.

4. Subacute ischemic infarct on MRI brain.

5. Seizure.

6. Acute liver injury.

7. Hyperlipidemia.

8. Hyponatremia.

9. Pneumonia.

10. Acute systolic heart failure, EF 35% to 40%.

11. Mitral regurgitation.

 

RECOMMENDATIONS:  Continue IV amiodarone for rate control.  Start IV metoprolol as the

patient does not have p.o. access and blood pressure has improved.  The patient has been

restarted on heparin for CVA prophylaxis.  Her CHADS-VASc score is 6, however, she was

not on anticoagulation prior to admission due to frequent falls.  Further discussion of

anticoagulation with family, once she has been extubated.  Discussed possibility of

brief anticoagulation with Watchman as an outpatient.  She will need ischemic evaluation

prior to discharge when she recovers from her neurologic event.  Continue current

cardiac medications. 

 

Thank you for this consult.  We will continue to follow.

 

 

 

 

______________________________

Cecilia Vital MD

 

ABS/MODL

D:  09/10/2019 11:18:25

T:  09/10/2019 11:53:46

Job #:  [id number]

## 2019-09-10 NOTE — NUR
Pt sleeping soundly and pt's daughter's long-time friend at bedside. Family friend sitting 
with pt to relieve family. 



Intervention:  Provided hospitality and card describing spiritual care services available.



Outcome: Will follow as able.



ZORAN POLLARD



Spiritual Care Department

O: [phone]

Pager: [phone] ([phone] number calling from)

## 2019-09-10 NOTE — NUR
DAUGHTER TRISHA [phone] STATES SHE LIVES IN [facility] AND WILL BE TAKING CARE OF 
MOTHER WHEN SHE DISCHARGES. STATES WOULD LIKE [facility] OR [facility] PLACEMENT. SHE STATES SHE 
WORKS AS A PEDIATRIC REHAB NURSE IN [facility] AND THAT GEOGRAPHICAL PLACEMENT WILL BE 
BETTER FOR HER.

## 2019-09-10 NOTE — NUR
Dr Huntley to bedside; wean and breathing trial, patient consistently apneic; per Dr Huntley, 
will give her more time on the vent and reassess.

## 2019-09-11 VITALS — SYSTOLIC BLOOD PRESSURE: 122 MMHG | DIASTOLIC BLOOD PRESSURE: 89 MMHG

## 2019-09-11 VITALS — DIASTOLIC BLOOD PRESSURE: 70 MMHG | SYSTOLIC BLOOD PRESSURE: 129 MMHG

## 2019-09-11 VITALS — SYSTOLIC BLOOD PRESSURE: 120 MMHG | DIASTOLIC BLOOD PRESSURE: 88 MMHG

## 2019-09-11 VITALS — DIASTOLIC BLOOD PRESSURE: 82 MMHG | SYSTOLIC BLOOD PRESSURE: 127 MMHG

## 2019-09-11 VITALS — DIASTOLIC BLOOD PRESSURE: 54 MMHG | SYSTOLIC BLOOD PRESSURE: 92 MMHG

## 2019-09-11 VITALS — SYSTOLIC BLOOD PRESSURE: 134 MMHG | DIASTOLIC BLOOD PRESSURE: 106 MMHG

## 2019-09-11 VITALS — DIASTOLIC BLOOD PRESSURE: 106 MMHG | SYSTOLIC BLOOD PRESSURE: 132 MMHG

## 2019-09-11 VITALS — SYSTOLIC BLOOD PRESSURE: 107 MMHG | DIASTOLIC BLOOD PRESSURE: 69 MMHG

## 2019-09-11 VITALS — DIASTOLIC BLOOD PRESSURE: 98 MMHG | SYSTOLIC BLOOD PRESSURE: 160 MMHG

## 2019-09-11 VITALS — SYSTOLIC BLOOD PRESSURE: 98 MMHG | DIASTOLIC BLOOD PRESSURE: 58 MMHG

## 2019-09-11 VITALS — DIASTOLIC BLOOD PRESSURE: 73 MMHG | SYSTOLIC BLOOD PRESSURE: 113 MMHG

## 2019-09-11 VITALS — SYSTOLIC BLOOD PRESSURE: 92 MMHG | DIASTOLIC BLOOD PRESSURE: 54 MMHG

## 2019-09-11 VITALS — DIASTOLIC BLOOD PRESSURE: 59 MMHG | SYSTOLIC BLOOD PRESSURE: 105 MMHG

## 2019-09-11 VITALS — DIASTOLIC BLOOD PRESSURE: 64 MMHG | SYSTOLIC BLOOD PRESSURE: 101 MMHG

## 2019-09-11 VITALS — SYSTOLIC BLOOD PRESSURE: 93 MMHG | DIASTOLIC BLOOD PRESSURE: 55 MMHG

## 2019-09-11 VITALS — DIASTOLIC BLOOD PRESSURE: 76 MMHG | SYSTOLIC BLOOD PRESSURE: 116 MMHG

## 2019-09-11 VITALS — SYSTOLIC BLOOD PRESSURE: 105 MMHG | DIASTOLIC BLOOD PRESSURE: 73 MMHG

## 2019-09-11 VITALS — DIASTOLIC BLOOD PRESSURE: 53 MMHG | SYSTOLIC BLOOD PRESSURE: 91 MMHG

## 2019-09-11 VITALS — SYSTOLIC BLOOD PRESSURE: 92 MMHG | DIASTOLIC BLOOD PRESSURE: 52 MMHG

## 2019-09-11 VITALS — DIASTOLIC BLOOD PRESSURE: 98 MMHG | SYSTOLIC BLOOD PRESSURE: 142 MMHG

## 2019-09-11 VITALS — DIASTOLIC BLOOD PRESSURE: 62 MMHG | SYSTOLIC BLOOD PRESSURE: 94 MMHG

## 2019-09-11 VITALS — SYSTOLIC BLOOD PRESSURE: 97 MMHG | DIASTOLIC BLOOD PRESSURE: 63 MMHG

## 2019-09-11 VITALS — SYSTOLIC BLOOD PRESSURE: 122 MMHG | DIASTOLIC BLOOD PRESSURE: 91 MMHG

## 2019-09-11 VITALS — SYSTOLIC BLOOD PRESSURE: 96 MMHG | DIASTOLIC BLOOD PRESSURE: 66 MMHG

## 2019-09-11 VITALS — DIASTOLIC BLOOD PRESSURE: 67 MMHG | SYSTOLIC BLOOD PRESSURE: 106 MMHG

## 2019-09-11 VITALS — DIASTOLIC BLOOD PRESSURE: 60 MMHG | SYSTOLIC BLOOD PRESSURE: 101 MMHG

## 2019-09-11 LAB
ALBUMIN SERPL-MCNC: 2 G/DL (ref 3.5–5)
ALBUMIN/GLOB SERPL: 0.7 {RATIO} (ref 0.8–2)
ALP SERPL-CCNC: 72 IU/L (ref 40–150)
ALT SERPL-CCNC: 40 IU/L (ref 0–55)
ANION GAP SERPL CALC-SCNC: 11.8 MMOL/L (ref 8–16)
ANION GAP SERPL CALC-SCNC: 13.6 MMOL/L (ref 8–16)
ANISOCYTOSIS BLD QL SMEAR: (no result)
BASE EXCESS BLDA CALC-SCNC: -1 MMOL/L (ref -2–3)
BASOPHILS # BLD AUTO: 0 10*3/UL (ref 0–0.1)
BASOPHILS # BLD AUTO: 0 10*3/UL (ref 0–0.1)
BASOPHILS NFR BLD AUTO: 0.2 % (ref 0–1)
BASOPHILS NFR BLD AUTO: 0.2 % (ref 0–1)
BUN SERPL-MCNC: 27 MG/DL (ref 7–26)
BUN SERPL-MCNC: 31 MG/DL (ref 7–26)
BUN/CREAT SERPL: 26 (ref 6–25)
BUN/CREAT SERPL: 26 (ref 6–25)
CALCIUM SERPL-MCNC: 7.6 MG/DL (ref 8.4–10.2)
CALCIUM SERPL-MCNC: 7.8 MG/DL (ref 8.4–10.2)
CHLORIDE SERPL-SCNC: 98 MMOL/L (ref 98–107)
CHLORIDE SERPL-SCNC: 98 MMOL/L (ref 98–107)
CO2 SERPL-SCNC: 27 MMOL/L (ref 22–29)
CO2 SERPL-SCNC: 30 MMOL/L (ref 22–29)
DEPRECATED NEUTROPHILS # BLD AUTO: 10.4 10*3/UL (ref 2.1–6.9)
DEPRECATED NEUTROPHILS # BLD AUTO: 9 10*3/UL (ref 2.1–6.9)
EGFRCR SERPLBLD CKD-EPI 2021: 44 ML/MIN (ref 60–?)
EGFRCR SERPLBLD CKD-EPI 2021: 53 ML/MIN (ref 60–?)
EOSINOPHIL # BLD AUTO: 0.1 10*3/UL (ref 0–0.4)
EOSINOPHIL # BLD AUTO: 0.1 10*3/UL (ref 0–0.4)
EOSINOPHIL NFR BLD AUTO: 0.5 % (ref 0–6)
EOSINOPHIL NFR BLD AUTO: 1.1 % (ref 0–6)
ERYTHROCYTE [DISTWIDTH] IN CORD BLOOD: 17.2 % (ref 11.7–14.4)
ERYTHROCYTE [DISTWIDTH] IN CORD BLOOD: 17.9 % (ref 11.7–14.4)
GLOBULIN PLAS-MCNC: 3 G/DL (ref 2.3–3.5)
GLUCOSE SERPLBLD-MCNC: 124 MG/DL (ref 74–118)
GLUCOSE SERPLBLD-MCNC: 132 MG/DL (ref 74–118)
HCO3 BLDA-SCNC: 23 MMOL/L (ref 23–28)
HCT VFR BLD AUTO: 33.3 % (ref 34.2–44.1)
HCT VFR BLD AUTO: 34.5 % (ref 34.2–44.1)
HGB BLD-MCNC: 11.2 G/DL (ref 12–16)
HGB BLD-MCNC: 11.2 G/DL (ref 12–16)
LYMPHOCYTES # BLD: 1.4 10*3/UL (ref 1–3.2)
LYMPHOCYTES # BLD: 1.4 10*3/UL (ref 1–3.2)
LYMPHOCYTES NFR BLD AUTO: 10.3 % (ref 18–39.1)
LYMPHOCYTES NFR BLD AUTO: 12 % (ref 18–39.1)
LYMPHOCYTES NFR BLD MANUAL: 14 % (ref 19–48)
MACROCYTES BLD QL SMEAR: (no result)
MCH RBC QN AUTO: 34.5 PG (ref 28–32)
MCH RBC QN AUTO: 35.1 PG (ref 28–32)
MCHC RBC AUTO-ENTMCNC: 32.5 G/DL (ref 31–35)
MCHC RBC AUTO-ENTMCNC: 33.6 G/DL (ref 31–35)
MCV RBC AUTO: 104.4 FL (ref 81–99)
MCV RBC AUTO: 106.2 FL (ref 81–99)
MONOCYTES # BLD AUTO: 0.6 10*3/UL (ref 0.2–0.8)
MONOCYTES # BLD AUTO: 1 10*3/UL (ref 0.2–0.8)
MONOCYTES NFR BLD AUTO: 5.2 % (ref 4.4–11.3)
MONOCYTES NFR BLD AUTO: 7.7 % (ref 4.4–11.3)
MONOCYTES NFR BLD MANUAL: 11 % (ref 3.4–9)
NEUTS BAND NFR BLD MANUAL: 4 %
NEUTS SEG NFR BLD AUTO: 79.1 % (ref 38.7–80)
NEUTS SEG NFR BLD AUTO: 79.7 % (ref 38.7–80)
NEUTS SEG NFR BLD MANUAL: 71 % (ref 40–74)
PCO2 BLDA: 33 MMHG (ref 41–51)
PCO2 BLDA: 44 MMHG (ref 80–105)
PH BLDA: 7.45 [PH] (ref 7.31–7.41)
PLAT MORPH BLD: NORMAL
PLATELET # BLD AUTO: 151 X10E3/UL (ref 140–360)
PLATELET # BLD AUTO: 163 X10E3/UL (ref 140–360)
PLATELET # BLD EST: ADEQUATE 10*3/UL
POIKILOCYTOSIS BLD QL SMEAR: SLIGHT
POTASSIUM SERPL-SCNC: 2.8 MMOL/L (ref 3.5–5.1)
POTASSIUM SERPL-SCNC: 3.6 MMOL/L (ref 3.5–5.1)
RBC # BLD AUTO: 3.19 X10E6/UL (ref 3.6–5.1)
RBC # BLD AUTO: 3.25 X10E6/UL (ref 3.6–5.1)
RBC MORPH BLD: (no result)
SAO2 % BLDA: 83 % (ref 95–98)
SODIUM SERPL-SCNC: 135 MMOL/L (ref 136–145)
SODIUM SERPL-SCNC: 137 MMOL/L (ref 136–145)

## 2019-09-11 RX ADMIN — AMPICILLIN SODIUM AND SULBACTAM SODIUM SCH MLS/HR: 1; .5 INJECTION, POWDER, FOR SOLUTION INTRAMUSCULAR; INTRAVENOUS at 06:22

## 2019-09-11 RX ADMIN — DEXTROSE AND SODIUM CHLORIDE SCH MLS/HR: 5; 900 INJECTION, SOLUTION INTRAVENOUS at 11:27

## 2019-09-11 RX ADMIN — SODIUM CHLORIDE SCH MLS/HR: 9 INJECTION, SOLUTION INTRAVENOUS at 09:54

## 2019-09-11 RX ADMIN — AMPICILLIN SODIUM AND SULBACTAM SODIUM SCH MLS/HR: 1; .5 INJECTION, POWDER, FOR SOLUTION INTRAMUSCULAR; INTRAVENOUS at 23:00

## 2019-09-11 RX ADMIN — SODIUM CHLORIDE TAB 1 GM SCH GM: 1 TAB at 20:29

## 2019-09-11 RX ADMIN — METOPROLOL TARTRATE SCH MG: 1 INJECTION, SOLUTION INTRAVENOUS at 05:57

## 2019-09-11 RX ADMIN — SODIUM CHLORIDE PRN MLS/HR: 900 INJECTION INTRAVENOUS at 19:30

## 2019-09-11 RX ADMIN — SODIUM CHLORIDE SCH MLS/HR: 9 INJECTION, SOLUTION INTRAVENOUS at 20:00

## 2019-09-11 RX ADMIN — FAMOTIDINE SCH MG: 10 INJECTION, SOLUTION INTRAVENOUS at 04:30

## 2019-09-11 RX ADMIN — SODIUM CHLORIDE PRN MLS/HR: 900 INJECTION INTRAVENOUS at 09:30

## 2019-09-11 RX ADMIN — SODIUM CHLORIDE TAB 1 GM SCH GM: 1 TAB at 09:00

## 2019-09-11 RX ADMIN — AMPICILLIN SODIUM AND SULBACTAM SODIUM SCH MLS/HR: 1; .5 INJECTION, POWDER, FOR SOLUTION INTRAMUSCULAR; INTRAVENOUS at 13:57

## 2019-09-11 RX ADMIN — FAMOTIDINE SCH MG: 10 INJECTION, SOLUTION INTRAVENOUS at 15:42

## 2019-09-11 RX ADMIN — PHENYTOIN SODIUM SCH MG: 50 INJECTION INTRAMUSCULAR; INTRAVENOUS at 21:14

## 2019-09-11 RX ADMIN — SODIUM CHLORIDE TAB 1 GM SCH GM: 1 TAB at 13:57

## 2019-09-11 RX ADMIN — METOPROLOL TARTRATE SCH MG: 1 INJECTION, SOLUTION INTRAVENOUS at 11:27

## 2019-09-11 RX ADMIN — Medication SCH MLS/HR: at 03:38

## 2019-09-11 RX ADMIN — SODIUM CHLORIDE PRN MLS/HR: 900 INJECTION INTRAVENOUS at 14:04

## 2019-09-11 RX ADMIN — METOPROLOL TARTRATE SCH MG: 50 TABLET, FILM COATED ORAL at 12:46

## 2019-09-11 RX ADMIN — SODIUM CHLORIDE SCH MLS/HR: 900 INJECTION INTRAVENOUS at 21:14

## 2019-09-11 RX ADMIN — METOPROLOL TARTRATE SCH MG: 1 INJECTION, SOLUTION INTRAVENOUS at 18:00

## 2019-09-11 RX ADMIN — CASTOR OIL AND BALSAM, PERU SCH GM: 788; 87 OINTMENT TOPICAL at 09:33

## 2019-09-11 RX ADMIN — METOPROLOL TARTRATE SCH MG: 50 TABLET, FILM COATED ORAL at 22:00

## 2019-09-11 NOTE — NUR
ASSESSMENT: Spiritual concern

Pt's daughter, Birdie, "stressed" concerning mother's illness. Pt's daughter states she is a 
"nurse at [employer]." Pt's daughter states she takes turns staying at hospital and 
her mother is a retired . Daughter states her mother identifies as 
Faith.



Intervention:  Provided unhurried empathic listening. Facilitated illness review. Provided 
prayer and information on how to reach , if needed.



Outcome: Will continue to follow as able. Pt's daughter expressed appreciation for visit.





ZORAN POLLARD



Spiritual Care Department

O: [phone]

Pager: [phone] ([phone] number calling from)

## 2019-09-11 NOTE — DIAGNOSTIC IMAGING REPORT
EXAMINATION:  CHEST SINGLE (PORTABLE)    



INDICATION: Intubation. 



COMPARISON: Chest radiograph 9/8/2019. 

     

FINDINGS:

TUBES and LINES:  Endotracheal tube terminates 0.9 cm above the diego. Right

IJ non-tunneled central venous catheter terminates near the cavoatrial

junction.



LUNGS: Multifocal patchy opacities. Mild perihilar and interstitial opacities.



PLEURA:  No pleural effusion or pneumothorax. 



HEART AND MEDIASTINUM:  The cardiomediastinal silhouette is enlarged. 



BONES AND SOFT TISSUES:  No acute osseous abnormality. 



UPPER ABDOMEN: No free air under the diaphragm.    



IMPRESSION: 

Lines and tubes as above. ET tube terminates 0.9 cm above the diego. Consider

retraction by 1-2 cm. No evidence of pneumothorax. 



Multifocal opacities, which may reflect multifocal pneumonia and/or pulmonary

edema.



Signed by: Dr. Kellen Frazier MD on 9/11/2019 7:36 AM

## 2019-09-11 NOTE — DIAGNOSTIC IMAGING REPORT
EXAMINATION:  CHEST SINGLE (PORTABLE)    



INDICATION: Endotracheal tube placement



COMPARISON: Chest radiograph of earlier the same day

     

FINDINGS:



LINES/TUBES:Endotracheal tube has been pulled back and is now in good position,

terminating 3.5 cm above the diego. Right IJ central venous catheter

terminates near the superior cavoatrial junction. EKG leads overlie the chest.



LUNGS:The lungs are moderately inflated. There has been interval significant

improvement in aeration of the left lung. Patchy opacity at the left lung base,

likely subsegmental atelectasis.



PLEURA:Possible small left pleural effusion. No pneumothorax. Crescentic

lucency along the upper left lung zone likely represents a skinfold.



MEDIASTINUM:Cardiomediastinal silhouette is stably enlarged.



BONES/SOFT TISSUES:No acute osseous injury.



ABDOMEN:No free air under the diaphragm.





IMPRESSION: 

Interval repositioning of endotracheal tube, now in good position terminating

3.5 cm above the diego.



Interval improved aeration of the left lung. Residual patchy opacity at the

left lung base likely subsegmental atelectasis.



Signed by: Kelechi Mendez MD on 9/11/2019 4:33 PM

## 2019-09-11 NOTE — PROGRESS NOTE
DATE:  09/11/2019

 

Cardiology Progress Note 

 

SUBJECTIVE:  The patient remains intubated.  She is sedated with Precedex.

 

OBJECTIVE:  VITAL SIGNS:  Temperature 98.5 degrees, pulse 77, respiratory rate 16, blood

pressure 101/60, oxygen saturation 100% on mechanical ventilation. 

GENERAL:  Chronically ill-appearing, frail, elderly woman, no acute distress.  Remains

intubated, sedated on Precedex. 

LUNGS:  Clear to auscultation bilaterally.  No wheezes or crackles. 

CARDIOVASCULAR:  Irregularly irregular.  Normal rate.  No murmur. 

ABDOMEN:  Soft, nontender. 

EXTREMITIES:  No edema.

 

CARDIAC MEDICATIONS:  Amiodarone 0.5 mg/minute, metoprolol tartrate 5 mg IV q.6 hours,

Bumex 1 mg an hour, heparin drip. 

 

LABORATORY DATA:  WBC 13.15, hemoglobin 11.2, hematocrit 34.5, platelets 163.  Sodium

135, potassium 3.6, chloride 98, CO2 of 27, BUN 31, creatinine 1.2. 

 

TELEMETRY:  Atrial fibrillation, rate controlled.

 

IMPRESSION:  

1. Atrial fibrillation, currently rate controlled.

2. Elevated troponin.

3. Acute on chronic kidney disease.

4. Subacute ischemic infarct on MRI brain.

5. Seizure.

6. Acute liver injury.

7. Hyperlipidemia.

8. Hyponatremia.

9. Pneumonia.

10. Acute systolic heart failure, EF 35% to 40%.

11. Mitral regurgitation.

 

RECOMMENDATIONS:  Continue IV amiodarone and IV metoprolol for rate control.  Her blood

pressure is reasonable on this regimen and rate is controlled.  This can be changed to

p.o. amiodarone and metoprolol once the patient has been extubated.  Continue heparin

for CVA prophylaxis.  Her CHADS-VASc score is 6.  We will need to discuss warfarin

versus Xarelto versus no anticoagulation once the patient is successfully extubated.

Family indicates that she was previously on Xarelto, but this was stopped due to

frequent falls.  She may be candidate for Watchman, discussion as an outpatient.  The

patient needs ischemic evaluation prior to discharge when she recovers neurologically

and is extubated.  Continue current cardiac medications for now. 

 

Thank you for this consult.  We will continue to follow.

 

 

 

 

______________________________

Cecilia Vital MD

 

ABS/MODL

D:  09/11/2019 15:39:23

T:  09/11/2019 16:47:38

Job #:  [id number]

## 2019-09-11 NOTE — DIAGNOSTIC IMAGING REPORT
EXAMINATION:  CHEST SINGLE (NOT PORTABLE)    



INDICATION: Endotracheal tube placement



COMPARISON: Chest



Of earlier the same day

     

FINDINGS:



LINES/TUBES:Interval placement of endotracheal tube which terminates in the

proximal right mainstem bronchus. Right IJ central venous catheter terminates

at the superior cavoatrial junction. EKG leads overlie the chest.



LUNGS:Interval extensive atelectasis of the left lung with near complete

opacification of the left lower lobe and decreased aeration of the left upper

lobe. The right lung is moderately aerated. Cast 5 granulomas at the right lung

base as before.



PLEURA:Likely small left pleural effusion. No pneumothorax.



MEDIASTINUM:The cardiomediastinal silhouette appears unchanged in size and

shape.



BONES/SOFT TISSUES:No acute osseous injury.



ABDOMEN:No free air under the diaphragm.





IMPRESSION: 

Interval intubation with endotracheal tube terminating in the proximal right

mainstem bronchus. Recommend withdrawal of endotracheal tube by approximately

3.5 cm.



Extensive interval atelectasis of the left lung as above.



The above findings were discussed with Dr. Justyna ARROYO on 9/11/2019 10:35 AM,

who responded indicating that the communication was understood.



Signed by: Kelechi Mendez MD on 9/11/2019 10:39 AM

## 2019-09-12 VITALS — SYSTOLIC BLOOD PRESSURE: 115 MMHG | DIASTOLIC BLOOD PRESSURE: 86 MMHG

## 2019-09-12 VITALS — DIASTOLIC BLOOD PRESSURE: 67 MMHG | SYSTOLIC BLOOD PRESSURE: 105 MMHG

## 2019-09-12 VITALS — SYSTOLIC BLOOD PRESSURE: 118 MMHG | DIASTOLIC BLOOD PRESSURE: 75 MMHG

## 2019-09-12 VITALS — DIASTOLIC BLOOD PRESSURE: 92 MMHG | SYSTOLIC BLOOD PRESSURE: 113 MMHG

## 2019-09-12 VITALS — SYSTOLIC BLOOD PRESSURE: 124 MMHG | DIASTOLIC BLOOD PRESSURE: 90 MMHG

## 2019-09-12 VITALS — DIASTOLIC BLOOD PRESSURE: 95 MMHG | SYSTOLIC BLOOD PRESSURE: 146 MMHG

## 2019-09-12 VITALS — DIASTOLIC BLOOD PRESSURE: 69 MMHG | SYSTOLIC BLOOD PRESSURE: 94 MMHG

## 2019-09-12 VITALS — DIASTOLIC BLOOD PRESSURE: 114 MMHG | SYSTOLIC BLOOD PRESSURE: 139 MMHG

## 2019-09-12 VITALS — SYSTOLIC BLOOD PRESSURE: 115 MMHG | DIASTOLIC BLOOD PRESSURE: 85 MMHG

## 2019-09-12 VITALS — SYSTOLIC BLOOD PRESSURE: 137 MMHG | DIASTOLIC BLOOD PRESSURE: 108 MMHG

## 2019-09-12 VITALS — SYSTOLIC BLOOD PRESSURE: 88 MMHG | DIASTOLIC BLOOD PRESSURE: 45 MMHG

## 2019-09-12 VITALS — DIASTOLIC BLOOD PRESSURE: 71 MMHG | SYSTOLIC BLOOD PRESSURE: 101 MMHG

## 2019-09-12 VITALS — DIASTOLIC BLOOD PRESSURE: 61 MMHG | SYSTOLIC BLOOD PRESSURE: 101 MMHG

## 2019-09-12 VITALS — SYSTOLIC BLOOD PRESSURE: 125 MMHG | DIASTOLIC BLOOD PRESSURE: 78 MMHG

## 2019-09-12 VITALS — SYSTOLIC BLOOD PRESSURE: 109 MMHG | DIASTOLIC BLOOD PRESSURE: 70 MMHG

## 2019-09-12 VITALS — SYSTOLIC BLOOD PRESSURE: 110 MMHG | DIASTOLIC BLOOD PRESSURE: 90 MMHG

## 2019-09-12 VITALS — SYSTOLIC BLOOD PRESSURE: 100 MMHG | DIASTOLIC BLOOD PRESSURE: 66 MMHG

## 2019-09-12 VITALS — DIASTOLIC BLOOD PRESSURE: 62 MMHG | SYSTOLIC BLOOD PRESSURE: 96 MMHG

## 2019-09-12 VITALS — SYSTOLIC BLOOD PRESSURE: 121 MMHG | DIASTOLIC BLOOD PRESSURE: 82 MMHG

## 2019-09-12 VITALS — SYSTOLIC BLOOD PRESSURE: 122 MMHG | DIASTOLIC BLOOD PRESSURE: 88 MMHG

## 2019-09-12 VITALS — DIASTOLIC BLOOD PRESSURE: 93 MMHG | SYSTOLIC BLOOD PRESSURE: 105 MMHG

## 2019-09-12 VITALS — DIASTOLIC BLOOD PRESSURE: 88 MMHG | SYSTOLIC BLOOD PRESSURE: 122 MMHG

## 2019-09-12 VITALS — SYSTOLIC BLOOD PRESSURE: 127 MMHG | DIASTOLIC BLOOD PRESSURE: 99 MMHG

## 2019-09-12 VITALS — DIASTOLIC BLOOD PRESSURE: 104 MMHG | SYSTOLIC BLOOD PRESSURE: 139 MMHG

## 2019-09-12 VITALS — SYSTOLIC BLOOD PRESSURE: 113 MMHG | DIASTOLIC BLOOD PRESSURE: 92 MMHG

## 2019-09-12 VITALS — DIASTOLIC BLOOD PRESSURE: 76 MMHG | SYSTOLIC BLOOD PRESSURE: 120 MMHG

## 2019-09-12 LAB
ANION GAP SERPL CALC-SCNC: 12.6 MMOL/L (ref 8–16)
BASE EXCESS BLDA CALC-SCNC: 3 MMOL/L (ref -2–3)
BASOPHILS # BLD AUTO: 0 10*3/UL (ref 0–0.1)
BASOPHILS NFR BLD AUTO: 0.2 % (ref 0–1)
BUN SERPL-MCNC: 24 MG/DL (ref 7–26)
BUN/CREAT SERPL: 23 (ref 6–25)
CALCIUM SERPL-MCNC: 7.6 MG/DL (ref 8.4–10.2)
CHLORIDE SERPL-SCNC: 99 MMOL/L (ref 98–107)
CO2 SERPL-SCNC: 29 MMOL/L (ref 22–29)
DEPRECATED NEUTROPHILS # BLD AUTO: 9.5 10*3/UL (ref 2.1–6.9)
EGFRCR SERPLBLD CKD-EPI 2021: 52 ML/MIN (ref 60–?)
EOSINOPHIL # BLD AUTO: 0.2 10*3/UL (ref 0–0.4)
EOSINOPHIL NFR BLD AUTO: 1.6 % (ref 0–6)
ERYTHROCYTE [DISTWIDTH] IN CORD BLOOD: 17.2 % (ref 11.7–14.4)
GLUCOSE SERPLBLD-MCNC: 180 MG/DL (ref 74–118)
HCO3 BLDA-SCNC: 26 MMOL/L (ref 23–28)
HCT VFR BLD AUTO: 34.7 % (ref 34.2–44.1)
HGB BLD-MCNC: 11.2 G/DL (ref 12–16)
LYMPHOCYTES # BLD: 1.5 10*3/UL (ref 1–3.2)
LYMPHOCYTES NFR BLD AUTO: 12.5 % (ref 18–39.1)
MAGNESIUM SERPL-MCNC: 2.1 MG/DL (ref 1.3–2.1)
MCH RBC QN AUTO: 33.8 PG (ref 28–32)
MCHC RBC AUTO-ENTMCNC: 32.3 G/DL (ref 31–35)
MCV RBC AUTO: 104.8 FL (ref 81–99)
MONOCYTES # BLD AUTO: 0.8 10*3/UL (ref 0.2–0.8)
MONOCYTES NFR BLD AUTO: 6.8 % (ref 4.4–11.3)
NEUTS SEG NFR BLD AUTO: 77.1 % (ref 38.7–80)
PCO2 BLDA: 122 MMHG (ref 80–105)
PCO2 BLDA: 36 MMHG (ref 41–51)
PH BLDA: 7.47 [PH] (ref 7.31–7.41)
PLATELET # BLD AUTO: 178 X10E3/UL (ref 140–360)
POTASSIUM SERPL-SCNC: 3.6 MMOL/L (ref 3.5–5.1)
RBC # BLD AUTO: 3.31 X10E6/UL (ref 3.6–5.1)
SAO2 % BLDA: 99 % (ref 95–98)
SODIUM SERPL-SCNC: 137 MMOL/L (ref 136–145)

## 2019-09-12 RX ADMIN — Medication SCH MLS/HR: at 19:00

## 2019-09-12 RX ADMIN — FAMOTIDINE SCH MG: 10 INJECTION, SOLUTION INTRAVENOUS at 05:45

## 2019-09-12 RX ADMIN — METOPROLOL TARTRATE SCH MG: 1 INJECTION, SOLUTION INTRAVENOUS at 18:01

## 2019-09-12 RX ADMIN — Medication SCH ML: at 19:15

## 2019-09-12 RX ADMIN — METOPROLOL TARTRATE SCH MG: 1 INJECTION, SOLUTION INTRAVENOUS at 06:40

## 2019-09-12 RX ADMIN — SODIUM CHLORIDE TAB 1 GM SCH GM: 1 TAB at 21:00

## 2019-09-12 RX ADMIN — SODIUM CHLORIDE SCH MLS/HR: 9 INJECTION, SOLUTION INTRAVENOUS at 10:20

## 2019-09-12 RX ADMIN — FAMOTIDINE SCH MG: 10 INJECTION, SOLUTION INTRAVENOUS at 16:46

## 2019-09-12 RX ADMIN — METOPROLOL TARTRATE SCH MG: 50 TABLET, FILM COATED ORAL at 14:00

## 2019-09-12 RX ADMIN — METOPROLOL TARTRATE SCH MG: 50 TABLET, FILM COATED ORAL at 22:00

## 2019-09-12 RX ADMIN — CASTOR OIL AND BALSAM, PERU SCH GM: 788; 87 OINTMENT TOPICAL at 09:35

## 2019-09-12 RX ADMIN — METOPROLOL TARTRATE SCH MG: 1 INJECTION, SOLUTION INTRAVENOUS at 10:57

## 2019-09-12 RX ADMIN — PHENYTOIN SODIUM SCH MG: 50 INJECTION INTRAMUSCULAR; INTRAVENOUS at 21:30

## 2019-09-12 RX ADMIN — AMPICILLIN SODIUM AND SULBACTAM SODIUM SCH MLS/HR: 1; .5 INJECTION, POWDER, FOR SOLUTION INTRAMUSCULAR; INTRAVENOUS at 06:40

## 2019-09-12 RX ADMIN — DEXTROSE AND SODIUM CHLORIDE SCH MLS/HR: 5; 900 INJECTION, SOLUTION INTRAVENOUS at 21:09

## 2019-09-12 RX ADMIN — SODIUM CHLORIDE PRN MLS/HR: 900 INJECTION INTRAVENOUS at 01:40

## 2019-09-12 RX ADMIN — DEXTROSE AND SODIUM CHLORIDE SCH MLS/HR: 5; 900 INJECTION, SOLUTION INTRAVENOUS at 06:39

## 2019-09-12 RX ADMIN — SODIUM CHLORIDE TAB 1 GM SCH GM: 1 TAB at 08:27

## 2019-09-12 RX ADMIN — SODIUM CHLORIDE SCH MLS/HR: 900 INJECTION INTRAVENOUS at 21:30

## 2019-09-12 RX ADMIN — Medication SCH ML: at 13:55

## 2019-09-12 RX ADMIN — LEVALBUTEROL HYDROCHLORIDE SCH MG: 0.63 SOLUTION RESPIRATORY (INHALATION) at 13:55

## 2019-09-12 RX ADMIN — SODIUM CHLORIDE SCH MLS/HR: 9 INJECTION, SOLUTION INTRAVENOUS at 17:30

## 2019-09-12 RX ADMIN — AMPICILLIN SODIUM AND SULBACTAM SODIUM SCH MLS/HR: 1; .5 INJECTION, POWDER, FOR SOLUTION INTRAMUSCULAR; INTRAVENOUS at 14:13

## 2019-09-12 RX ADMIN — AMPICILLIN SODIUM AND SULBACTAM SODIUM SCH MLS/HR: 1; .5 INJECTION, POWDER, FOR SOLUTION INTRAMUSCULAR; INTRAVENOUS at 22:45

## 2019-09-12 RX ADMIN — METOPROLOL TARTRATE SCH MG: 1 INJECTION, SOLUTION INTRAVENOUS at 00:00

## 2019-09-12 RX ADMIN — SODIUM CHLORIDE TAB 1 GM SCH GM: 1 TAB at 14:12

## 2019-09-12 RX ADMIN — LEVALBUTEROL HYDROCHLORIDE SCH MG: 0.63 SOLUTION RESPIRATORY (INHALATION) at 19:15

## 2019-09-12 RX ADMIN — METOPROLOL TARTRATE SCH MG: 50 TABLET, FILM COATED ORAL at 05:45

## 2019-09-12 NOTE — PROGRESS NOTE
DATE:  09/12/2019

 

Cardiology Progress Note 

 

SUBJECTIVE:  The patient was extubated today.  She denies any chest pain.

 

OBJECTIVE:  VITAL SIGNS:  Temperature 97.8 degrees, pulse 101, respiratory rate 22,

blood pressure 125/70, and oxygen saturation 100% on 4 L nasal cannula. 

GENERAL:  Chronically ill-appearing, frail, elderly woman, in no acute distress.  Awake. 

LUNGS:  Clear to auscultation bilaterally.  No wheezes or crackles. 

CARDIOVASCULAR:  Irregularly irregular, tachycardic.  No murmur. 

ABDOMEN:  Soft.  Nontender. 

EXTREMITIES:  No edema in lower extremities, but upper extremity is noted to be

edematous. 

 

CARDIAC MEDICATIONS:  

1. Metoprolol tartrate 5 mg IV q.6 hours.

2. Bumex 0.5 mg an hour.

3. Amiodarone 0.5 mg/min.

 

LABORATORY DATA:  WBC 12.33, hemoglobin 11.2, hematocrit 34.7, and platelets 178.

Sodium 137, potassium 3.6, chloride 99, CO2 of 29, BUN 24, creatinine 1.03. 

 

TELEMETRY:  Atrial fibrillation with borderline rate control.

 

IMPRESSION:  

1. Atrial fibrillation with borderline rate control.

2. Elevated troponin.

3. Acute-on-chronic kidney disease.

4. Subacute ischemic infarct on MRI brain.

5. Seizure.

6. Acute liver injury.

7. Hyperlipidemia.

8. Hyponatremia.

9. Pneumonia.

10. Acute systolic heart failure, ejection fraction of 35% to 40%.

11. Mitral regurgitation.

 

RECOMMENDATIONS:  Continue IV amiodarone, IV metoprolol for rate control.  Blood

pressure is reasonable on this regimen and heart rate is reasonably well controlled.

Continue heparin drip for CVA prophylaxis.  The patient's CHADS-VASc score 6, we will

need to discuss warfarin versus Xarelto versus no anticoagulation once the patient has

improved.  Family indicates she was previously on Xarelto, but this was stopped due to

frequent falls.  She may be a candidate for Watchman, discussion as an outpatient.  The

patient needs ischemic evaluation prior to discharge when she recovers neurological, we

will follow up on venous Doppler of the left upper extremity given her asymmetric edema.

 Continue current cardiac medications, otherwise.  Continue supportive care. 

 

Thank you for this consult.  We will continue to follow.

 

 

 

 

______________________________

Cecilia Vital MD

 

ABS/MODL

D:  09/12/2019 16:20:15

T:  09/12/2019 19:02:40

Job #:  [id number]

## 2019-09-12 NOTE — NUR
ST NOTE:



Order for BSE acknowledged.  Pt intubated from 9/7/19 - 9/12/19 and extubated at 0917.  
Bedside swallow evaluation to be completed 9/13/19 at least 24 hours post extubation.

## 2019-09-12 NOTE — PROGRESS NOTE
DATE:  09/12/2019  

 

SUBJECTIVE:  Ms. Yang, who was seen and examined today.  As mentioned above, she is

doing better. 

 

No complaints.  Remains in intensive care unit.

 

IMPRESSION:  

1. Congestive heart failure.

2. Left cerebral aneurysm.

3. Urinary tract infection.

4. Elevated liver enzyme.

5. Extended-spectrum beta-lactamases with Escherichia coli on ampicillin sulbactam to

finish total of 14 days. 

6. History of seizure.

7. History of atrial fibrillation.

8. Acute kidney injury on chronic kidney disease.   

 

Overall stable from Infectious Disease point of view.  Please refer to the note in the

chart. 

 

 

 

 

______________________________

MD JEROD Oden/MODL

D:  09/12/2019 12:13:33

T:  09/12/2019 14:35:19

Job #:  [id number]

## 2019-09-12 NOTE — NUR
ASSESSMENT: Spiritual concern

Pt's family encouraged by improvements. Pt's daughter states she is relieved following 
successful extubation. 



Intervention:  Provided hospitality and empathic listening. Facilitated illness review. 
Provided pt & family information on how to reach , if needed.



Outcome: Will continue to follow as able.



ZORAN POLLARD



Spiritual Care Department

O: [phone]

Pager: [phone] ([phone] number calling from)

## 2019-09-13 VITALS — SYSTOLIC BLOOD PRESSURE: 134 MMHG | DIASTOLIC BLOOD PRESSURE: 101 MMHG

## 2019-09-13 VITALS — DIASTOLIC BLOOD PRESSURE: 103 MMHG | SYSTOLIC BLOOD PRESSURE: 131 MMHG

## 2019-09-13 VITALS — SYSTOLIC BLOOD PRESSURE: 135 MMHG | DIASTOLIC BLOOD PRESSURE: 105 MMHG

## 2019-09-13 VITALS — DIASTOLIC BLOOD PRESSURE: 79 MMHG | SYSTOLIC BLOOD PRESSURE: 122 MMHG

## 2019-09-13 VITALS — DIASTOLIC BLOOD PRESSURE: 58 MMHG | SYSTOLIC BLOOD PRESSURE: 111 MMHG

## 2019-09-13 VITALS — SYSTOLIC BLOOD PRESSURE: 124 MMHG | DIASTOLIC BLOOD PRESSURE: 97 MMHG

## 2019-09-13 VITALS — DIASTOLIC BLOOD PRESSURE: 94 MMHG | SYSTOLIC BLOOD PRESSURE: 136 MMHG

## 2019-09-13 VITALS — DIASTOLIC BLOOD PRESSURE: 95 MMHG | SYSTOLIC BLOOD PRESSURE: 140 MMHG

## 2019-09-13 VITALS — DIASTOLIC BLOOD PRESSURE: 61 MMHG | SYSTOLIC BLOOD PRESSURE: 99 MMHG

## 2019-09-13 VITALS — SYSTOLIC BLOOD PRESSURE: 101 MMHG | DIASTOLIC BLOOD PRESSURE: 79 MMHG

## 2019-09-13 VITALS — SYSTOLIC BLOOD PRESSURE: 154 MMHG | DIASTOLIC BLOOD PRESSURE: 95 MMHG

## 2019-09-13 VITALS — DIASTOLIC BLOOD PRESSURE: 120 MMHG | SYSTOLIC BLOOD PRESSURE: 141 MMHG

## 2019-09-13 VITALS — DIASTOLIC BLOOD PRESSURE: 92 MMHG | SYSTOLIC BLOOD PRESSURE: 140 MMHG

## 2019-09-13 VITALS — DIASTOLIC BLOOD PRESSURE: 86 MMHG | SYSTOLIC BLOOD PRESSURE: 148 MMHG

## 2019-09-13 VITALS — DIASTOLIC BLOOD PRESSURE: 98 MMHG | SYSTOLIC BLOOD PRESSURE: 133 MMHG

## 2019-09-13 VITALS — DIASTOLIC BLOOD PRESSURE: 55 MMHG | SYSTOLIC BLOOD PRESSURE: 109 MMHG

## 2019-09-13 VITALS — SYSTOLIC BLOOD PRESSURE: 141 MMHG | DIASTOLIC BLOOD PRESSURE: 104 MMHG

## 2019-09-13 VITALS — SYSTOLIC BLOOD PRESSURE: 134 MMHG | DIASTOLIC BLOOD PRESSURE: 98 MMHG

## 2019-09-13 VITALS — DIASTOLIC BLOOD PRESSURE: 107 MMHG | SYSTOLIC BLOOD PRESSURE: 134 MMHG

## 2019-09-13 VITALS — DIASTOLIC BLOOD PRESSURE: 96 MMHG | SYSTOLIC BLOOD PRESSURE: 135 MMHG

## 2019-09-13 VITALS — DIASTOLIC BLOOD PRESSURE: 61 MMHG | SYSTOLIC BLOOD PRESSURE: 111 MMHG

## 2019-09-13 VITALS — DIASTOLIC BLOOD PRESSURE: 117 MMHG | SYSTOLIC BLOOD PRESSURE: 147 MMHG

## 2019-09-13 VITALS — DIASTOLIC BLOOD PRESSURE: 80 MMHG | SYSTOLIC BLOOD PRESSURE: 123 MMHG

## 2019-09-13 VITALS — DIASTOLIC BLOOD PRESSURE: 77 MMHG | SYSTOLIC BLOOD PRESSURE: 120 MMHG

## 2019-09-13 VITALS — DIASTOLIC BLOOD PRESSURE: 72 MMHG | SYSTOLIC BLOOD PRESSURE: 117 MMHG

## 2019-09-13 VITALS — DIASTOLIC BLOOD PRESSURE: 104 MMHG | SYSTOLIC BLOOD PRESSURE: 140 MMHG

## 2019-09-13 VITALS — SYSTOLIC BLOOD PRESSURE: 144 MMHG | DIASTOLIC BLOOD PRESSURE: 90 MMHG

## 2019-09-13 VITALS — SYSTOLIC BLOOD PRESSURE: 126 MMHG | DIASTOLIC BLOOD PRESSURE: 93 MMHG

## 2019-09-13 VITALS — DIASTOLIC BLOOD PRESSURE: 83 MMHG | SYSTOLIC BLOOD PRESSURE: 102 MMHG

## 2019-09-13 VITALS — DIASTOLIC BLOOD PRESSURE: 95 MMHG | SYSTOLIC BLOOD PRESSURE: 154 MMHG

## 2019-09-13 VITALS — SYSTOLIC BLOOD PRESSURE: 123 MMHG | DIASTOLIC BLOOD PRESSURE: 80 MMHG

## 2019-09-13 VITALS — DIASTOLIC BLOOD PRESSURE: 87 MMHG | SYSTOLIC BLOOD PRESSURE: 124 MMHG

## 2019-09-13 VITALS — SYSTOLIC BLOOD PRESSURE: 141 MMHG | DIASTOLIC BLOOD PRESSURE: 99 MMHG

## 2019-09-13 VITALS — DIASTOLIC BLOOD PRESSURE: 102 MMHG | SYSTOLIC BLOOD PRESSURE: 136 MMHG

## 2019-09-13 LAB
ANION GAP SERPL CALC-SCNC: 12.2 MMOL/L (ref 8–16)
BASE EXCESS BLDA CALC-SCNC: -2 MMOL/L (ref -2–3)
BASE EXCESS BLDA CALC-SCNC: 4 MMOL/L (ref -2–3)
BASOPHILS # BLD AUTO: 0 10*3/UL (ref 0–0.1)
BASOPHILS NFR BLD AUTO: 0.3 % (ref 0–1)
BUN SERPL-MCNC: 26 MG/DL (ref 7–26)
BUN/CREAT SERPL: 20 (ref 6–25)
CALCIUM SERPL-MCNC: 7.9 MG/DL (ref 8.4–10.2)
CHLORIDE SERPL-SCNC: 100 MMOL/L (ref 98–107)
CO2 SERPL-SCNC: 30 MMOL/L (ref 22–29)
DEPRECATED NEUTROPHILS # BLD AUTO: 8.3 10*3/UL (ref 2.1–6.9)
DEPRECATED PHOSPHATE SERPL-MCNC: 2.7 MG/DL (ref 2.3–4.7)
EGFRCR SERPLBLD CKD-EPI 2021: 41 ML/MIN (ref 60–?)
EOSINOPHIL # BLD AUTO: 0.1 10*3/UL (ref 0–0.4)
EOSINOPHIL NFR BLD AUTO: 0.9 % (ref 0–6)
ERYTHROCYTE [DISTWIDTH] IN CORD BLOOD: 17.4 % (ref 11.7–14.4)
GLUCOSE SERPLBLD-MCNC: 150 MG/DL (ref 74–118)
HCO3 BLDA-SCNC: 24 MMOL/L (ref 23–28)
HCO3 BLDA-SCNC: 28 MMOL/L (ref 23–28)
HCT VFR BLD AUTO: 35.3 % (ref 34.2–44.1)
HGB BLD-MCNC: 11.3 G/DL (ref 12–16)
LYMPHOCYTES # BLD: 1.4 10*3/UL (ref 1–3.2)
LYMPHOCYTES NFR BLD AUTO: 13.1 % (ref 18–39.1)
MAGNESIUM SERPL-MCNC: 1.9 MG/DL (ref 1.3–2.1)
MCH RBC QN AUTO: 34 PG (ref 28–32)
MCHC RBC AUTO-ENTMCNC: 32 G/DL (ref 31–35)
MCV RBC AUTO: 106.3 FL (ref 81–99)
MONOCYTES # BLD AUTO: 0.8 10*3/UL (ref 0.2–0.8)
MONOCYTES NFR BLD AUTO: 7.3 % (ref 4.4–11.3)
NEUTS SEG NFR BLD AUTO: 76.6 % (ref 38.7–80)
PCO2 BLDA: 123 MMHG (ref 80–105)
PCO2 BLDA: 41 MMHG (ref 41–51)
PCO2 BLDA: 42 MMHG (ref 41–51)
PCO2 BLDA: 64 MMHG (ref 80–105)
PH BLDA: 7.36 [PH] (ref 7.31–7.41)
PH BLDA: 7.43 [PH] (ref 7.31–7.41)
PHENYTOIN SERPL-MCNC: 11.66 UG/ML (ref 10–20)
PLATELET # BLD AUTO: 220 X10E3/UL (ref 140–360)
POTASSIUM SERPL-SCNC: 3.2 MMOL/L (ref 3.5–5.1)
RBC # BLD AUTO: 3.32 X10E6/UL (ref 3.6–5.1)
SAO2 % BLDA: 64 % (ref 95–98)
SAO2 % BLDA: 99 % (ref 95–98)
SODIUM SERPL-SCNC: 139 MMOL/L (ref 136–145)

## 2019-09-13 RX ADMIN — SODIUM CHLORIDE PRN MLS/HR: 900 INJECTION INTRAVENOUS at 21:45

## 2019-09-13 RX ADMIN — LEVALBUTEROL HYDROCHLORIDE SCH MG: 0.63 SOLUTION RESPIRATORY (INHALATION) at 07:00

## 2019-09-13 RX ADMIN — AMPICILLIN SODIUM AND SULBACTAM SODIUM SCH MLS/HR: 1; .5 INJECTION, POWDER, FOR SOLUTION INTRAMUSCULAR; INTRAVENOUS at 05:13

## 2019-09-13 RX ADMIN — Medication SCH ML: at 01:05

## 2019-09-13 RX ADMIN — LEVALBUTEROL HYDROCHLORIDE SCH MG: 0.63 SOLUTION RESPIRATORY (INHALATION) at 01:05

## 2019-09-13 RX ADMIN — SODIUM CHLORIDE SCH MLS/HR: 9 INJECTION, SOLUTION INTRAVENOUS at 22:00

## 2019-09-13 RX ADMIN — PHENYTOIN SODIUM SCH MG: 50 INJECTION INTRAMUSCULAR; INTRAVENOUS at 21:25

## 2019-09-13 RX ADMIN — Medication SCH ML: at 13:30

## 2019-09-13 RX ADMIN — METOPROLOL TARTRATE SCH MG: 1 INJECTION, SOLUTION INTRAVENOUS at 11:45

## 2019-09-13 RX ADMIN — SODIUM CHLORIDE TAB 1 GM SCH GM: 1 TAB at 15:00

## 2019-09-13 RX ADMIN — FAMOTIDINE SCH MG: 10 INJECTION, SOLUTION INTRAVENOUS at 17:31

## 2019-09-13 RX ADMIN — LEVALBUTEROL HYDROCHLORIDE SCH MG: 0.63 SOLUTION RESPIRATORY (INHALATION) at 19:45

## 2019-09-13 RX ADMIN — SODIUM CHLORIDE TAB 1 GM SCH GM: 1 TAB at 21:00

## 2019-09-13 RX ADMIN — FAMOTIDINE SCH MG: 10 INJECTION, SOLUTION INTRAVENOUS at 04:45

## 2019-09-13 RX ADMIN — DEXTROSE AND SODIUM CHLORIDE SCH MLS/HR: 5; 900 INJECTION, SOLUTION INTRAVENOUS at 14:30

## 2019-09-13 RX ADMIN — METOPROLOL TARTRATE SCH MG: 1 INJECTION, SOLUTION INTRAVENOUS at 21:25

## 2019-09-13 RX ADMIN — SODIUM CHLORIDE TAB 1 GM SCH GM: 1 TAB at 08:39

## 2019-09-13 RX ADMIN — CASTOR OIL AND BALSAM, PERU SCH GM: 788; 87 OINTMENT TOPICAL at 09:00

## 2019-09-13 RX ADMIN — METOPROLOL TARTRATE SCH MG: 1 INJECTION, SOLUTION INTRAVENOUS at 14:00

## 2019-09-13 RX ADMIN — METOPROLOL TARTRATE SCH MG: 50 TABLET, FILM COATED ORAL at 05:35

## 2019-09-13 RX ADMIN — DEXTROSE MONOHYDRATE SCH MLS/HR: 100 INJECTION, SOLUTION INTRAVENOUS at 18:58

## 2019-09-13 RX ADMIN — METOPROLOL TARTRATE SCH MG: 1 INJECTION, SOLUTION INTRAVENOUS at 17:35

## 2019-09-13 RX ADMIN — Medication SCH ML: at 19:45

## 2019-09-13 RX ADMIN — Medication SCH ML: at 07:00

## 2019-09-13 RX ADMIN — METOPROLOL TARTRATE SCH MG: 1 INJECTION, SOLUTION INTRAVENOUS at 01:35

## 2019-09-13 RX ADMIN — METOPROLOL TARTRATE SCH MG: 50 TABLET, FILM COATED ORAL at 21:30

## 2019-09-13 RX ADMIN — SODIUM CHLORIDE SCH MLS/HR: 900 INJECTION INTRAVENOUS at 21:25

## 2019-09-13 RX ADMIN — METOPROLOL TARTRATE SCH MG: 1 INJECTION, SOLUTION INTRAVENOUS at 05:13

## 2019-09-13 RX ADMIN — METOPROLOL TARTRATE SCH MG: 50 TABLET, FILM COATED ORAL at 14:00

## 2019-09-13 RX ADMIN — SODIUM CHLORIDE PRN MG: 900 INJECTION INTRAVENOUS at 11:45

## 2019-09-13 RX ADMIN — LEVALBUTEROL HYDROCHLORIDE SCH MG: 0.63 SOLUTION RESPIRATORY (INHALATION) at 13:30

## 2019-09-13 NOTE — NUR
Nutrition Intervention Note



RD Recommendation(s) for Physician: 

-If PO is feasible, rec cardiac diet as medically feasible; diet texture per SLP rec

-If PO is not feasible, rec continuous TF with Osmolite 1.2 @50mL/hr, providing 1440kcal, 
67g protein, 984mL water

-50mL water flushes q4hr; additional per MD

-Check daily labs, weight, and gastric tolerance 



Plan of Care: RD following, monitoring for tolerance and adequacy, TF rec 



Nutrition reason for involvement: NPO x 5 days, follow up 



RD Assessment

9/13  Pt was extubated and on nasal cannula. Pt was discussed during AM rounds. Pt failed 
bedside swallow; MBS pending. No pressor meds noted. No BM recorded since admission. Will 
continue to monitor and follow.  



(9/7/2019) Chart reviewed. Labs and meds reviewed. Initial encounter with patient. Pt denies 
any nausea, vomiting, diarrhea, nor has any difficulty chewing or swallowing. Pt denies any 
wt changes. Pt was eating well PTA. Good Po intake currently. Pt states that she is lactose 
intolerant

Principal Problems/Diagnoses: UTI, atrial fibrillation, Elevated liver function test



PMH: Hypertension, hyperlipidemia, congestive heart failure, atrial fibrillation, 
gastroesophageal reflux disease, history of deep venous thrombosis, left cerebral artery 
aneurysm



GI: abdomen soft, non-tender, round, no BM recorded  



Skin: no pressure wound noted 



Labs: 9/13 - K 3.2 L, Creatinine 1.28 H, Glucose 150 H, Ca 7.9 L 



Meds: cefepime, pepcid, NaCl, dilantin, dextrose, bumetanide



Ht: 62in

Wt: 147lb

BMI: 26.9kg/m2

IBW: 110lb +/- 10%



Malnutrition Evaluation (9/7/2019)

The patient does not meet criteria for a specified degree of malnutrition at this time. Will 
re-evaluate at follow-up as appropriate. 



Nutrition Prescription (Diet Order): NPO 



Estimated Nutritional Needs (off vent):

Calories: 1340  1675kcal(20-25kcal/kg/d) Weight used: CBW 

Protein: 67 -100g (1-1.5g/kg/d) Weight used: CBW



Diet Adequacy:

Not meeting calorie needs, Not meeting protein needs



Diet Education Needs Assessment:

Diet education not indicated.



Nutrition Care Level: mod 



Nutrition Diagnosis: Inadequate oral intake related to current medical status as evidenced 
by NPO x 5 days. 



Goal: Patient will meet % of estimated needs by follow up 



Progress: Not Progressing



Interventions:

Composition, Rate, Route



Monitoring/Evaluation:

Total energy intake, Total protein intake, Formula/Solution, IVF, Weight change



Signed: Bridgette Curry MS, RD, LD

## 2019-09-13 NOTE — PROGRESS NOTE
DATE:  09/12/2019  

 

SUBJECTIVE:  Ms. Yang remains in intensive care unit, but comfortable.  The patient

was on the vent until yesterday. 

 

PHYSICAL EXAMINATION:

GENERAL:  She is currently alert, comfortable. 

VITAL SIGNS:  Stable, afebrile. 

HEENT:  Normocephalic, not icteric. 

NECK:  Supple.  No JVD.  No lymphadenopathy.  No thyromegaly. 

CHEST:  Clear bilateral. 

HEART:  S1, S2.  No murmur. 

ABDOMEN:  Soft.  Bowel sounds present.  No tenderness. 

EXTREMITIES:  No edema.

IMPRESSION:  

1. No seizure activity.

2. Subacute ischemic infarct on MRI.

3. Acute liver injury.

4. Hyperlipidemia.

5. Acute congestive heart failure. 

 

 

DICTATION ENDS HERE

 

 

 

 

______________________________

MD JEROD Oden/MODL

D:  09/12/2019 12:11:49

T:  09/12/2019 14:42:31

Job #:  [id number]

## 2019-09-13 NOTE — DIAGNOSTIC IMAGING REPORT
EXAMINATION:  CHEST SINGLE (PORTABLE)    



INDICATION: Extubated     



COMPARISON:  Chest radiograph 9/12/2019

     

FINDINGS:  AP view   



TUBES and LINES:  Interval removal of the ET tube. Unchanged right IJ central

venous catheter, tip at the superior cavoatrial junction..



LUNGS:  Lungs are well inflated. Increased pulmonary interstitial markings.

Prominent central pulmonary vasculature.



PLEURA:  Small bilateral pleural effusions.



HEART AND MEDIASTINUM:  Cardiac size is mildly enlarged.    



BONES AND SOFT TISSUES:  No acute osseous lesion.  Soft tissues are

unremarkable.



UPPER ABDOMEN: No free air under the diaphragm.    



IMPRESSION: 



Mild cardiomegaly with mild pulmonary vascular congestion and small bilateral

pleural effusions.





Signed by: Josué Reyes DO on 9/13/2019 6:52 AM

## 2019-09-13 NOTE — NUR
Patient has been more agitated and confused than previous night. Patient on BiPap attempting 
to pull off mask. Patient does not respond to questions or open eyes when spoken to. 
Daughters at bedside speaking to Patient and she mumbles or weakly moans "What?" when they 
call her Mom. ROSARIO WNL. At family's request Dr. Preston called and informed of Patient's 
status. No new orders at this time. RN relayed to daughters that Dr. Corona feels patient 
is suffering from ICU psychosis.

## 2019-09-13 NOTE — PROGRESS NOTE
DATE:  09/13/2019

 

Cardiology Progress Note 

 

SUBJECTIVE:  The patient failed her swallow study today.  She is somewhat confused.

 

OBJECTIVE:  VITAL SIGNS:  Temperature 98.4 degrees, pulse 102, respiratory rate 22,

blood pressure 140/104, and oxygen saturation 98% on 4 L nasal cannula. 

GENERAL:  Elderly frail woman, chronically ill appearing, in no acute distress, awake,

but confused. 

LUNGS:  Clear to auscultation bilaterally.  No wheezes or crackles. 

CARDIOVASCULAR:  Irregularly irregular, tachycardiac.  No murmur. 

ABDOMEN:  Soft, nontender. 

EXTREMITIES:  No edema in lower extremity, left upper extremity is noted to be edematous.

 

CARDIAC MEDICATIONS:  

1. Bumetanide drip.

2. Heparin drip.

3. Metoprolol tartrate 5 mg IV q.4 hours.

4. Amiodarone drip.

 

LABORATORY DATA:  WBC 10.86, hemoglobin 11.3, hematocrit 35.3, platelets 220.  Sodium

139, potassium 3.2, chloride 100, CO2 of 30, BUN 26, creatinine 1.28. 

 

TELEMETRY:  Atrial fibrillation, borderline rate control.

 

IMPRESSION:  

1. Atrial fibrillation with borderline rate control.

2. Elevated troponin.

3. Acute on chronic kidney disease.

4. Subacute ischemic infarct on MRI brain.

5. Seizure.

6. Acute liver injury, resolved.

7. Hyperlipidemia.

8. Hyponatremia.

9. Pneumonia.

10. Acute systolic heart failure, EF 35% to 40%.

11. Mitral regurgitation.

 

RECOMMENDATIONS:  Continue amiodarone drip.  Increase IV metoprolol to q.4 hours to

improve rate control.  Unable to change to p.o. due to failure of swallow study.

Modified barium swallow is planned.  Continue heparin drip for CVA prophylaxis.  The

patient's CHADS-VASc score is 6 and thus we will need to discuss warfarin versus Xarelto

versus no anticoagulation once the patient is ready for discharge.  The patient's family

indicates the patient was previously on Xarelto, but this was stopped due to frequent

falls.  She may be a candidate for Watchman, discussion as an outpatient.  She will need

ischemic evaluation prior to discharge when she is stronger.  Left upper extremity

venous Doppler revealed thrombosis in the cephalic vein, recommend hot and cold

compresses as well as elevation.  Continue current cardiac medications otherwise.

Continue supportive care. 

 

Thank you for this consult.  We will continue to follow.

 

 

 

 

______________________________

MD TERENCE Ovalles/DAISY

D:  09/13/2019 14:59:26

T:  09/13/2019 17:26:49

Job #:  [id number]

## 2019-09-13 NOTE — DIAGNOSTIC IMAGING REPORT
Examination: Single AP view of the chest.



COMPARISON: Portable chest 9/13/2019



INDICATION: Tachypnea, atrial fibrillation

    

IMPRESSION:

 

1.  Lines and Tubes: Unchanged right central line.

2.  Diffuse bilateral interstitial opacities extending from the joanne, likely

reflecting pulmonary edema. Possible small left pleural effusion. Increase

density in the retrocardiac region, which may reflect atelectasis or

consolidation.

3.  Enlarged cardiac silhouette.  Central pulmonary venous congestion.

4.  No acute bony abnormalities.



Signed by: Dr. Manuel Stanley M.D. on 9/13/2019 4:22 PM

## 2019-09-14 VITALS — DIASTOLIC BLOOD PRESSURE: 101 MMHG | SYSTOLIC BLOOD PRESSURE: 136 MMHG

## 2019-09-14 VITALS — DIASTOLIC BLOOD PRESSURE: 80 MMHG | SYSTOLIC BLOOD PRESSURE: 119 MMHG

## 2019-09-14 VITALS — SYSTOLIC BLOOD PRESSURE: 125 MMHG | DIASTOLIC BLOOD PRESSURE: 87 MMHG

## 2019-09-14 VITALS — SYSTOLIC BLOOD PRESSURE: 108 MMHG | DIASTOLIC BLOOD PRESSURE: 75 MMHG

## 2019-09-14 VITALS — SYSTOLIC BLOOD PRESSURE: 159 MMHG | DIASTOLIC BLOOD PRESSURE: 106 MMHG

## 2019-09-14 VITALS — DIASTOLIC BLOOD PRESSURE: 83 MMHG | SYSTOLIC BLOOD PRESSURE: 124 MMHG

## 2019-09-14 VITALS — DIASTOLIC BLOOD PRESSURE: 63 MMHG | SYSTOLIC BLOOD PRESSURE: 103 MMHG

## 2019-09-14 VITALS — SYSTOLIC BLOOD PRESSURE: 119 MMHG | DIASTOLIC BLOOD PRESSURE: 75 MMHG

## 2019-09-14 VITALS — DIASTOLIC BLOOD PRESSURE: 113 MMHG | SYSTOLIC BLOOD PRESSURE: 138 MMHG

## 2019-09-14 VITALS — SYSTOLIC BLOOD PRESSURE: 130 MMHG | DIASTOLIC BLOOD PRESSURE: 70 MMHG

## 2019-09-14 VITALS — SYSTOLIC BLOOD PRESSURE: 123 MMHG | DIASTOLIC BLOOD PRESSURE: 98 MMHG

## 2019-09-14 VITALS — SYSTOLIC BLOOD PRESSURE: 129 MMHG | DIASTOLIC BLOOD PRESSURE: 76 MMHG

## 2019-09-14 VITALS — DIASTOLIC BLOOD PRESSURE: 59 MMHG | SYSTOLIC BLOOD PRESSURE: 93 MMHG

## 2019-09-14 VITALS — DIASTOLIC BLOOD PRESSURE: 74 MMHG | SYSTOLIC BLOOD PRESSURE: 113 MMHG

## 2019-09-14 VITALS — DIASTOLIC BLOOD PRESSURE: 106 MMHG | SYSTOLIC BLOOD PRESSURE: 126 MMHG

## 2019-09-14 VITALS — DIASTOLIC BLOOD PRESSURE: 109 MMHG | SYSTOLIC BLOOD PRESSURE: 147 MMHG

## 2019-09-14 VITALS — SYSTOLIC BLOOD PRESSURE: 112 MMHG | DIASTOLIC BLOOD PRESSURE: 62 MMHG

## 2019-09-14 VITALS — SYSTOLIC BLOOD PRESSURE: 127 MMHG | DIASTOLIC BLOOD PRESSURE: 65 MMHG

## 2019-09-14 VITALS — SYSTOLIC BLOOD PRESSURE: 111 MMHG | DIASTOLIC BLOOD PRESSURE: 92 MMHG

## 2019-09-14 VITALS — DIASTOLIC BLOOD PRESSURE: 108 MMHG | SYSTOLIC BLOOD PRESSURE: 167 MMHG

## 2019-09-14 VITALS — DIASTOLIC BLOOD PRESSURE: 68 MMHG | SYSTOLIC BLOOD PRESSURE: 128 MMHG

## 2019-09-14 VITALS — SYSTOLIC BLOOD PRESSURE: 125 MMHG | DIASTOLIC BLOOD PRESSURE: 81 MMHG

## 2019-09-14 VITALS — SYSTOLIC BLOOD PRESSURE: 126 MMHG | DIASTOLIC BLOOD PRESSURE: 86 MMHG

## 2019-09-14 VITALS — DIASTOLIC BLOOD PRESSURE: 64 MMHG | SYSTOLIC BLOOD PRESSURE: 154 MMHG

## 2019-09-14 VITALS — SYSTOLIC BLOOD PRESSURE: 139 MMHG | DIASTOLIC BLOOD PRESSURE: 57 MMHG

## 2019-09-14 VITALS — SYSTOLIC BLOOD PRESSURE: 138 MMHG | DIASTOLIC BLOOD PRESSURE: 86 MMHG

## 2019-09-14 VITALS — DIASTOLIC BLOOD PRESSURE: 81 MMHG | SYSTOLIC BLOOD PRESSURE: 158 MMHG

## 2019-09-14 VITALS — DIASTOLIC BLOOD PRESSURE: 91 MMHG | SYSTOLIC BLOOD PRESSURE: 114 MMHG

## 2019-09-14 VITALS — SYSTOLIC BLOOD PRESSURE: 141 MMHG | DIASTOLIC BLOOD PRESSURE: 118 MMHG

## 2019-09-14 LAB
ANION GAP SERPL CALC-SCNC: 13.4 MMOL/L (ref 8–16)
BACTERIA URNS QL MICRO: (no result) /HPF
BASOPHILS # BLD AUTO: 0 10*3/UL (ref 0–0.1)
BASOPHILS NFR BLD AUTO: 0.3 % (ref 0–1)
BILIRUB UR QL: NEGATIVE
BUN SERPL-MCNC: 31 MG/DL (ref 7–26)
BUN/CREAT SERPL: 20 (ref 6–25)
CALCIUM SERPL-MCNC: 8.3 MG/DL (ref 8.4–10.2)
CHLORIDE SERPL-SCNC: 101 MMOL/L (ref 98–107)
CLARITY UR: CLEAR
CO2 SERPL-SCNC: 29 MMOL/L (ref 22–29)
COLOR UR: YELLOW
DEPRECATED NEUTROPHILS # BLD AUTO: 10.7 10*3/UL (ref 2.1–6.9)
DEPRECATED RBC URNS MANUAL-ACNC: (no result) /HPF (ref 0–5)
EGFRCR SERPLBLD CKD-EPI 2021: 32 ML/MIN (ref 60–?)
EOSINOPHIL # BLD AUTO: 0 10*3/UL (ref 0–0.4)
EOSINOPHIL NFR BLD AUTO: 0.2 % (ref 0–6)
EPI CELLS URNS QL MICRO: (no result) /LPF
ERYTHROCYTE [DISTWIDTH] IN CORD BLOOD: 17.1 % (ref 11.7–14.4)
GLUCOSE SERPLBLD-MCNC: 147 MG/DL (ref 74–118)
HCT VFR BLD AUTO: 35.7 % (ref 34.2–44.1)
HGB BLD-MCNC: 11.2 G/DL (ref 12–16)
HYALINE CASTS #/AREA URNS LPF: (no result) /[LPF] (ref 0–1)
KETONES UR QL STRIP.AUTO: NEGATIVE
LEUKOCYTE ESTERASE UR QL STRIP.AUTO: (no result)
LYMPHOCYTES # BLD: 1.5 10*3/UL (ref 1–3.2)
LYMPHOCYTES NFR BLD AUTO: 11.2 % (ref 18–39.1)
MCH RBC QN AUTO: 33.6 PG (ref 28–32)
MCHC RBC AUTO-ENTMCNC: 31.4 G/DL (ref 31–35)
MCV RBC AUTO: 107.2 FL (ref 81–99)
MONOCYTES # BLD AUTO: 0.9 10*3/UL (ref 0.2–0.8)
MONOCYTES NFR BLD AUTO: 6.8 % (ref 4.4–11.3)
MUCOUS THREADS URNS QL MICRO: (no result)
NEUTS SEG NFR BLD AUTO: 80.3 % (ref 38.7–80)
NITRITE UR QL STRIP.AUTO: NEGATIVE
PLATELET # BLD AUTO: 190 X10E3/UL (ref 140–360)
POTASSIUM SERPL-SCNC: 3.4 MMOL/L (ref 3.5–5.1)
PROT UR QL STRIP.AUTO: NEGATIVE
RBC # BLD AUTO: 3.33 X10E6/UL (ref 3.6–5.1)
SODIUM SERPL-SCNC: 140 MMOL/L (ref 136–145)
SP GR UR STRIP: 1.01 (ref 1.01–1.02)
UROBILINOGEN UR STRIP-MCNC: 0.2 MG/DL (ref 0.2–1)
WBC #/AREA URNS HPF: (no result) /HPF (ref 0–5)
YEAST URNS QL MICRO: (no result)

## 2019-09-14 RX ADMIN — SODIUM CHLORIDE SCH MLS/HR: 900 INJECTION INTRAVENOUS at 21:18

## 2019-09-14 RX ADMIN — Medication SCH ML: at 00:55

## 2019-09-14 RX ADMIN — METOPROLOL TARTRATE SCH MG: 50 TABLET, FILM COATED ORAL at 22:00

## 2019-09-14 RX ADMIN — SODIUM CHLORIDE TAB 1 GM SCH GM: 1 TAB at 21:00

## 2019-09-14 RX ADMIN — CASTOR OIL AND BALSAM, PERU SCH GM: 788; 87 OINTMENT TOPICAL at 09:57

## 2019-09-14 RX ADMIN — PHENYTOIN SODIUM SCH MG: 50 INJECTION INTRAMUSCULAR; INTRAVENOUS at 21:17

## 2019-09-14 RX ADMIN — SODIUM CHLORIDE TAB 1 GM SCH GM: 1 TAB at 15:00

## 2019-09-14 RX ADMIN — DEXTROSE MONOHYDRATE SCH MLS/HR: 100 INJECTION, SOLUTION INTRAVENOUS at 19:20

## 2019-09-14 RX ADMIN — LEVALBUTEROL HYDROCHLORIDE SCH MG: 0.63 SOLUTION RESPIRATORY (INHALATION) at 13:05

## 2019-09-14 RX ADMIN — FAMOTIDINE SCH MG: 10 INJECTION, SOLUTION INTRAVENOUS at 05:30

## 2019-09-14 RX ADMIN — LEVALBUTEROL HYDROCHLORIDE SCH MG: 0.63 SOLUTION RESPIRATORY (INHALATION) at 07:10

## 2019-09-14 RX ADMIN — Medication SCH MLS/HR: at 19:00

## 2019-09-14 RX ADMIN — Medication SCH MLS/HR: at 00:20

## 2019-09-14 RX ADMIN — Medication SCH MLS/HR: at 00:57

## 2019-09-14 RX ADMIN — METOPROLOL TARTRATE SCH MG: 1 INJECTION, SOLUTION INTRAVENOUS at 15:00

## 2019-09-14 RX ADMIN — METOPROLOL TARTRATE SCH MG: 50 TABLET, FILM COATED ORAL at 14:00

## 2019-09-14 RX ADMIN — METOPROLOL TARTRATE SCH MG: 50 TABLET, FILM COATED ORAL at 23:12

## 2019-09-14 RX ADMIN — LEVALBUTEROL HYDROCHLORIDE SCH MG: 0.63 SOLUTION RESPIRATORY (INHALATION) at 19:15

## 2019-09-14 RX ADMIN — SODIUM CHLORIDE TAB 1 GM SCH GM: 1 TAB at 09:00

## 2019-09-14 RX ADMIN — Medication SCH ML: at 07:10

## 2019-09-14 RX ADMIN — FAMOTIDINE SCH MG: 10 INJECTION, SOLUTION INTRAVENOUS at 17:32

## 2019-09-14 RX ADMIN — Medication SCH ML: at 19:15

## 2019-09-14 RX ADMIN — Medication SCH ML: at 13:00

## 2019-09-14 RX ADMIN — METOPROLOL TARTRATE SCH MG: 1 INJECTION, SOLUTION INTRAVENOUS at 02:00

## 2019-09-14 RX ADMIN — METOPROLOL TARTRATE SCH MG: 1 INJECTION, SOLUTION INTRAVENOUS at 05:30

## 2019-09-14 RX ADMIN — LEVALBUTEROL HYDROCHLORIDE SCH MG: 0.63 SOLUTION RESPIRATORY (INHALATION) at 00:55

## 2019-09-14 RX ADMIN — METOPROLOL TARTRATE SCH MG: 1 INJECTION, SOLUTION INTRAVENOUS at 10:00

## 2019-09-14 RX ADMIN — METOPROLOL TARTRATE SCH MG: 1 INJECTION, SOLUTION INTRAVENOUS at 23:08

## 2019-09-14 RX ADMIN — METOPROLOL TARTRATE SCH MG: 1 INJECTION, SOLUTION INTRAVENOUS at 19:16

## 2019-09-14 RX ADMIN — METOPROLOL TARTRATE SCH MG: 50 TABLET, FILM COATED ORAL at 05:43

## 2019-09-14 NOTE — PROGRESS NOTE
DATE:  09/14/2019  

 

SUBJECTIVE:  Ms. Yang remains confused in the intensive care unit.  Discussed with

the family.  Discussed with medical team.  The patient remains on amiodarone,

bumetanide, dexmedetomidine, heparin, off antibiotic.  She had ESBL in September 5th in

the urine.  Her white count is 13.27, hemoglobin 11.2, sodium 140, potassium 3.4,

creatinine of 1.57. 

 

MEDICATION LIST:  Reviewed.  She is on Xopenex, Atrovent, Pepcid, amiodarone, Dilantin,

and Lopressor. 

 

REVIEW OF SYSTEMS:

GENERAL:  She is lethargic, but according to the family, the  was sitting next to

her, tells me today she is better than yesterday. 

 

PHYSICAL EXAMINATION:

GENERAL:  She is currently alert, follows command, weak. 

VITAL SIGNS:  Stable, afebrile. 

HEENT:  Not icteric. 

NECK:  Supple. 

CHEST:  Clear. 

HEART:  S1 and S2, no murmur. 

ABDOMEN:  Soft.  Bowel sounds present.  No tenderness. 

EXTREMITIES:  No edema. 

SKIN:  No rash.

IMPRESSION:  

1. Altered mental status, status post subacute infarct on MRI.

2. History of seizure.

3. Acute liver injury, resolved.

4. Atrial fibrillation.

5. Hyperlipidemia.

6. Pneumonia, resolved.

7. Urinary tract infection, on admission, also resolved. 

I recommend to continue observing the patient off antibiotic, reassess in the morning.

We will follow. 

 

 

 

 

______________________________

MD JEROD Oden/DAISY

D:  09/14/2019 16:31:03

T:  09/14/2019 19:01:45

Job #:  [id number]

## 2019-09-14 NOTE — PROGRESS NOTE
DATE:  09/14/2019

 

Cardiology Progress Note 

 

SUBJECTIVE:  The patient is lethargic, unable to obtain review of systems.

 

OBJECTIVE:  VITAL SIGNS:  Temperature is 97.9, heart rate is 102, respiratory rate is

22, blood pressure is 119/75, and oxygen saturation 99%. 

GENERAL:  She is chronically ill-appearing elderly woman, in mild respiratory distress. 

CARDIOVASCULAR:  Irregularly irregular. 

LUNGS:  Diminished breath sounds at bases. 

ABDOMEN:  Soft, nontender, and nondistended. 

EXTREMITIES:  No edema.

 

LABORATORY DATA:  Reviewed.  Hemoglobin 11.2 and platelets 190.  Creatinine 1.5 and

potassium 3.4. 

 

CARDIOVASCULAR MEDICATIONS:  Reviewed.

 

IMPRESSION:  

1. Atrial fibrillation.

2. Elevated troponin.

3. Acute-on-chronic kidney disease.

4. Ischemic infarct on MRI of the brain.

5. Seizure.

6. Liver injury, resolved.

7. Hypokalemia.

8. Acute systolic congestive heart failure.

9. Malnutrition.

 

RECOMMENDATIONS:  Continue amiodarone and intravenous metoprolol for rate control.  She

will need eventually to be on anticoagulation long-term for her elevated CHADS-VASc

score.  She may be a Watchman candidate in the future if unable to tolerate

anticoagulation.  I would recommend initiation of tube feedings or TPN given this is

significant amount of time off any nutrition.  You can stop the heparin drip for a few

hours for placement of a Dobhoff tube. 

 

 

 

 

______________________________

DO HUBER Garcia/MODL

D:  09/14/2019 17:27:06

T:  09/14/2019 17:40:10

Job #:  [id number]

## 2019-09-14 NOTE — DIAGNOSTIC IMAGING REPORT
EXAMINATION:  CHEST SINGLE (PORTABLE)    



INDICATION:       

^Resp Distress

^[id number]

^0600

^Y    



COMPARISON:  Chest radiograph 9/13/2019

     

FINDINGS:  AP view   



TUBES and LINES:  Unchanged right IJ central venous catheter with tip overlying

the cavoatrial junction.



LUNGS:  Lungs are well inflated.  Improved bilateral pulmonary edema.  

Bibasilar atelectasis.



PLEURA:  No pleural effusion or pneumothorax.



HEART AND MEDIASTINUM:  Stable mild enlargement of the cardiac silhouette.  



BONES AND SOFT TISSUES:  No acute osseous lesion.  Soft tissues are

unremarkable.



UPPER ABDOMEN: No free air under the diaphragm.    



IMPRESSION: 

Improved bilateral pulmonary edema.





Signed by: Dr. Nichole Priest M.D. on 9/14/2019 7:44 AM

## 2019-09-14 NOTE — NUR
Report received. Assumed care. Assessment done. See interventions. On 50% Vapotherm O2. D10 
at 40ml/hr, Amiodarone @ 0.5mg, & Heparin @ 750 units/hr.

## 2019-09-14 NOTE — NUR
At Family's request RN contacted Dr. Buck regarding patient's altered mental status. New 
orders obtained. Patient taken to CT without incident or s/s distress. Labs ordered for 
morning.

## 2019-09-14 NOTE — DIAGNOSTIC IMAGING REPORT
History:Altered mental status.

Comparison studies:None



Technique:

Axial images were obtained from the skull base to the vertex.

Coronal and sagittal images reconstructed from the axial data.

Intravenous contrast: None

Dose modulation, iterative reconstruction, and/or weight based adjustment of

the mA/kV was utilized to reduce the radiation dose to as low as reasonably

achievable.



Findings:



Scalp/skull: 

No abnormalities.



Extra-axial spaces: 

No masses.  No fluid collections.



Brain sulci: Mildly prominent.

Ventricles: Mild compensatory dilatation. No hydrocephalus.



Parenchyma: 

Scattered and hazy hypodensities in the supratentorial white matter are small

vessel ischemic changes. No masses, hemorrhage, acute or chronic cortical

vascular insults.



Sellar/suprasellar region: No abnormalities.

Craniocervical junction: Patent foramen magnum.  No Chiari one malformation.



Incidental findings:

Atherosclerotic calcifications in the carotid siphons .



Impression:



No acute abnormalities.



Chronic findings:

1.  Mild generalized volume loss.

2.  Mild supratentorial white matter small vessel ischemic changes.



Signed by: DR Rajesh Duarte M.D. on 9/14/2019 1:28 AM

## 2019-09-15 VITALS — SYSTOLIC BLOOD PRESSURE: 158 MMHG | DIASTOLIC BLOOD PRESSURE: 137 MMHG

## 2019-09-15 VITALS — DIASTOLIC BLOOD PRESSURE: 85 MMHG | SYSTOLIC BLOOD PRESSURE: 127 MMHG

## 2019-09-15 VITALS — SYSTOLIC BLOOD PRESSURE: 137 MMHG | DIASTOLIC BLOOD PRESSURE: 85 MMHG

## 2019-09-15 VITALS — DIASTOLIC BLOOD PRESSURE: 96 MMHG | SYSTOLIC BLOOD PRESSURE: 109 MMHG

## 2019-09-15 VITALS — DIASTOLIC BLOOD PRESSURE: 83 MMHG | SYSTOLIC BLOOD PRESSURE: 125 MMHG

## 2019-09-15 VITALS — DIASTOLIC BLOOD PRESSURE: 133 MMHG | SYSTOLIC BLOOD PRESSURE: 145 MMHG

## 2019-09-15 VITALS — DIASTOLIC BLOOD PRESSURE: 74 MMHG | SYSTOLIC BLOOD PRESSURE: 111 MMHG

## 2019-09-15 VITALS — DIASTOLIC BLOOD PRESSURE: 83 MMHG | SYSTOLIC BLOOD PRESSURE: 118 MMHG

## 2019-09-15 VITALS — SYSTOLIC BLOOD PRESSURE: 126 MMHG | DIASTOLIC BLOOD PRESSURE: 56 MMHG

## 2019-09-15 VITALS — DIASTOLIC BLOOD PRESSURE: 85 MMHG | SYSTOLIC BLOOD PRESSURE: 110 MMHG

## 2019-09-15 VITALS — SYSTOLIC BLOOD PRESSURE: 117 MMHG | DIASTOLIC BLOOD PRESSURE: 90 MMHG

## 2019-09-15 VITALS — SYSTOLIC BLOOD PRESSURE: 127 MMHG | DIASTOLIC BLOOD PRESSURE: 85 MMHG

## 2019-09-15 VITALS — DIASTOLIC BLOOD PRESSURE: 79 MMHG | SYSTOLIC BLOOD PRESSURE: 132 MMHG

## 2019-09-15 VITALS — SYSTOLIC BLOOD PRESSURE: 146 MMHG | DIASTOLIC BLOOD PRESSURE: 101 MMHG

## 2019-09-15 VITALS — SYSTOLIC BLOOD PRESSURE: 124 MMHG | DIASTOLIC BLOOD PRESSURE: 77 MMHG

## 2019-09-15 VITALS — SYSTOLIC BLOOD PRESSURE: 144 MMHG | DIASTOLIC BLOOD PRESSURE: 128 MMHG

## 2019-09-15 VITALS — SYSTOLIC BLOOD PRESSURE: 125 MMHG | DIASTOLIC BLOOD PRESSURE: 83 MMHG

## 2019-09-15 VITALS — DIASTOLIC BLOOD PRESSURE: 96 MMHG | SYSTOLIC BLOOD PRESSURE: 92 MMHG

## 2019-09-15 VITALS — SYSTOLIC BLOOD PRESSURE: 137 MMHG | DIASTOLIC BLOOD PRESSURE: 92 MMHG

## 2019-09-15 VITALS — SYSTOLIC BLOOD PRESSURE: 115 MMHG | DIASTOLIC BLOOD PRESSURE: 74 MMHG

## 2019-09-15 VITALS — DIASTOLIC BLOOD PRESSURE: 89 MMHG | SYSTOLIC BLOOD PRESSURE: 133 MMHG

## 2019-09-15 VITALS — DIASTOLIC BLOOD PRESSURE: 87 MMHG | SYSTOLIC BLOOD PRESSURE: 128 MMHG

## 2019-09-15 VITALS — SYSTOLIC BLOOD PRESSURE: 92 MMHG | DIASTOLIC BLOOD PRESSURE: 96 MMHG

## 2019-09-15 LAB
ANION GAP SERPL CALC-SCNC: 17.8 MMOL/L (ref 8–16)
ANION GAP SERPL CALC-SCNC: 19.5 MMOL/L (ref 8–16)
BASE EXCESS BLDA CALC-SCNC: -1 MMOL/L (ref -2–3)
BASOPHILS # BLD AUTO: 0 10*3/UL (ref 0–0.1)
BASOPHILS NFR BLD AUTO: 0.2 % (ref 0–1)
BUN SERPL-MCNC: 33 MG/DL (ref 7–26)
BUN SERPL-MCNC: 38 MG/DL (ref 7–26)
BUN/CREAT SERPL: 16 (ref 6–25)
BUN/CREAT SERPL: 17 (ref 6–25)
CALCIUM SERPL-MCNC: 8.1 MG/DL (ref 8.4–10.2)
CALCIUM SERPL-MCNC: 8.5 MG/DL (ref 8.4–10.2)
CHLORIDE SERPL-SCNC: 96 MMOL/L (ref 98–107)
CHLORIDE SERPL-SCNC: 97 MMOL/L (ref 98–107)
CO2 SERPL-SCNC: 18 MMOL/L (ref 22–29)
CO2 SERPL-SCNC: 22 MMOL/L (ref 22–29)
DEPRECATED NEUTROPHILS # BLD AUTO: 10.6 10*3/UL (ref 2.1–6.9)
EGFRCR SERPLBLD CKD-EPI 2021: 20 ML/MIN (ref 60–?)
EGFRCR SERPLBLD CKD-EPI 2021: 25 ML/MIN (ref 60–?)
EOSINOPHIL # BLD AUTO: 0 10*3/UL (ref 0–0.4)
EOSINOPHIL NFR BLD AUTO: 0.1 % (ref 0–6)
ERYTHROCYTE [DISTWIDTH] IN CORD BLOOD: 16.9 % (ref 11.7–14.4)
GLUCOSE SERPLBLD-MCNC: 159 MG/DL (ref 74–118)
GLUCOSE SERPLBLD-MCNC: 187 MG/DL (ref 74–118)
HCO3 BLDA-SCNC: 22 MMOL/L (ref 23–28)
HCT VFR BLD AUTO: 33.7 % (ref 34.2–44.1)
HGB BLD-MCNC: 10.9 G/DL (ref 12–16)
LYMPHOCYTES # BLD: 1.8 10*3/UL (ref 1–3.2)
LYMPHOCYTES NFR BLD AUTO: 12.9 % (ref 18–39.1)
MCH RBC QN AUTO: 33.4 PG (ref 28–32)
MCHC RBC AUTO-ENTMCNC: 32.3 G/DL (ref 31–35)
MCV RBC AUTO: 103.4 FL (ref 81–99)
MONOCYTES # BLD AUTO: 1 10*3/UL (ref 0.2–0.8)
MONOCYTES NFR BLD AUTO: 6.9 % (ref 4.4–11.3)
NEUTS SEG NFR BLD AUTO: 77.5 % (ref 38.7–80)
PCO2 BLDA: 27 MMHG (ref 41–51)
PCO2 BLDA: 71 MMHG (ref 80–105)
PH BLDA: 7.52 [PH] (ref 7.31–7.41)
PLATELET # BLD AUTO: 148 X10E3/UL (ref 140–360)
POTASSIUM SERPL-SCNC: 3.8 MMOL/L (ref 3.5–5.1)
POTASSIUM SERPL-SCNC: 4.5 MMOL/L (ref 3.5–5.1)
RBC # BLD AUTO: 3.26 X10E6/UL (ref 3.6–5.1)
SAO2 % BLDA: 96 % (ref 95–98)
SODIUM SERPL-SCNC: 129 MMOL/L (ref 136–145)
SODIUM SERPL-SCNC: 133 MMOL/L (ref 136–145)

## 2019-09-15 RX ADMIN — METOPROLOL TARTRATE SCH MG: 1 INJECTION, SOLUTION INTRAVENOUS at 17:30

## 2019-09-15 RX ADMIN — METOPROLOL TARTRATE SCH MG: 1 INJECTION, SOLUTION INTRAVENOUS at 22:16

## 2019-09-15 RX ADMIN — LEVALBUTEROL HYDROCHLORIDE SCH MG: 0.63 SOLUTION RESPIRATORY (INHALATION) at 00:03

## 2019-09-15 RX ADMIN — LEVALBUTEROL HYDROCHLORIDE SCH MG: 0.63 SOLUTION RESPIRATORY (INHALATION) at 13:02

## 2019-09-15 RX ADMIN — CASTOR OIL AND BALSAM, PERU SCH GM: 788; 87 OINTMENT TOPICAL at 09:40

## 2019-09-15 RX ADMIN — DEXTROSE MONOHYDRATE SCH MLS/HR: 100 INJECTION, SOLUTION INTRAVENOUS at 22:00

## 2019-09-15 RX ADMIN — Medication SCH ML: at 13:02

## 2019-09-15 RX ADMIN — METOPROLOL TARTRATE SCH MG: 50 TABLET, FILM COATED ORAL at 21:37

## 2019-09-15 RX ADMIN — METOPROLOL TARTRATE SCH MG: 1 INJECTION, SOLUTION INTRAVENOUS at 03:05

## 2019-09-15 RX ADMIN — LEVALBUTEROL HYDROCHLORIDE SCH MG: 0.63 SOLUTION RESPIRATORY (INHALATION) at 19:08

## 2019-09-15 RX ADMIN — METOPROLOL TARTRATE SCH MG: 50 TABLET, FILM COATED ORAL at 14:00

## 2019-09-15 RX ADMIN — SODIUM CHLORIDE TAB 1 GM SCH GM: 1 TAB at 09:00

## 2019-09-15 RX ADMIN — METOPROLOL TARTRATE SCH MG: 1 INJECTION, SOLUTION INTRAVENOUS at 11:53

## 2019-09-15 RX ADMIN — METOPROLOL TARTRATE SCH MG: 1 INJECTION, SOLUTION INTRAVENOUS at 17:32

## 2019-09-15 RX ADMIN — Medication SCH ML: at 19:08

## 2019-09-15 RX ADMIN — FAMOTIDINE SCH MG: 10 INJECTION, SOLUTION INTRAVENOUS at 17:07

## 2019-09-15 RX ADMIN — LEVALBUTEROL HYDROCHLORIDE SCH MG: 0.63 SOLUTION RESPIRATORY (INHALATION) at 07:15

## 2019-09-15 RX ADMIN — SODIUM CHLORIDE SCH MLS/HR: 900 INJECTION INTRAVENOUS at 21:00

## 2019-09-15 RX ADMIN — Medication SCH MLS/HR: at 06:54

## 2019-09-15 RX ADMIN — Medication SCH ML: at 07:15

## 2019-09-15 RX ADMIN — Medication SCH ML: at 00:03

## 2019-09-15 RX ADMIN — METOPROLOL TARTRATE SCH MG: 1 INJECTION, SOLUTION INTRAVENOUS at 14:00

## 2019-09-15 RX ADMIN — FAMOTIDINE SCH MG: 10 INJECTION, SOLUTION INTRAVENOUS at 04:13

## 2019-09-15 RX ADMIN — Medication SCH MLS/HR: at 17:54

## 2019-09-15 RX ADMIN — METOPROLOL TARTRATE SCH MG: 1 INJECTION, SOLUTION INTRAVENOUS at 06:38

## 2019-09-15 NOTE — NUR
Patient was repositioned to left side at 2000, family requests that she be repositioned to 
back at this time.  Explained at time of repositioning to side that it was being done for 
pressure reduction, explained a gain at this time but familyl insists that patient be placed 
on back.

## 2019-09-15 NOTE — PROGRESS NOTE
DATE:  09/15/2019  

 

SUBJECTIVE:  Ms. Yang remains in intensive care unit, lethargic, a little bit short

of breath last night.  No fevers till. 

 

OBJECTIVE:  VITAL SIGNS:  Temperature 98.5, heart rate of 92, respiration 21, and blood

pressure 127/85. 

HEENT:  Normocephalic. 

CHEST:  Few crackles bilateral. 

COR:  S1, S2.  No murmur. 

ABDOMEN:  Soft.

 

LABORATORY DATA:  Her white count is 13.68, hemoglobin 10.9.  Sodium 133, potassium 3.8,

creatinine 1.98. 

 

IMPRESSION:  

1. Shortness of breath, worse.  The chest x-ray is suggestive of volume overload.

Suggest diuresis. 

2. Chronic kidney disease.

3. Atrial fibrillation.

4. Ischemic infarct on MRI of the brain.

5. Prognosis is guarded.  We will follow.

 

 

 

 

______________________________

MD JEROD Oden/DAISY

D:  09/15/2019 16:01:34

T:  09/15/2019 18:30:47

Job #:  [id number]

## 2019-09-15 NOTE — PROGRESS NOTE
DATE:  09/15/2019

 

Cardiology Progress Note 

 

SUBJECTIVE:  The patient is lethargic, confused.

 

OBJECTIVE:  VITAL SIGNS:  Temperature is 98.5, heart rate 93, respirations are 30, blood

pressure is 137/90, and oxygen saturation is 98% on nasal cannula. 

GENERAL:  She is an elderly appearing woman, altered, lethargic. 

CARDIOVASCULAR:  Irregularly irregular, normal rate. 

LUNGS:  Diminished breath sounds at bases. 

ABDOMEN:  Soft, nontender, nondistended. 

EXTREMITIES:  Trace edema.

 

CARDIOVASCULAR MEDICATIONS:  Reviewed.

 

LABORATORY DATA:  Hemoglobin 10.9, creatinine 1.9, potassium 3.8.

 

TELEMETRY:  Monitoring revealed atrial fibrillation.

 

IMPRESSION:  

1. Atrial fibrillation.

2. Elevated troponin.

3. Acute-on-chronic kidney disease.

4. Ischemic infarct on MRI of the brain.

5. Seizures.

6. Liver injury, resolved.

7. Acute systolic congestive heart failure.

8. Malnutrition.

 

RECOMMENDATIONS:  Continue current cardiovascular medications including amiodarone and

metoprolol for rate control.  She is continued on heparin for therapeutic

anticoagulation.  She may be a Watchman candidate in the future if unable to tolerate

anticoagulation.  The patient will undergo swallow evaluation tomorrow with possible

G-tube placement. 

 

 

 

 

______________________________

Crispin Hood DO

 

BM/MODL

D:  09/15/2019 12:10:50

T:  09/15/2019 12:42:12

Job #:  [id number]

## 2019-09-15 NOTE — DIAGNOSTIC IMAGING REPORT
A single frontal view of the chest.



HISTORY:  Atrial fibrillation, respiratory distress

COMPARISON:  Chest radiograph April 14, 2019.

     

DISCUSSION:

Portable technique,  limits sensitivity of the exam.

Multiple overlying artifacts.

Tubes/Lines:  Unchanged appearance of the right internal jugular central venous

catheter.



Lungs and pleura:  

The lungs are well inflated.  

No interval increase in the diffusely increased pulmonary interstitial markings

and patchy airspace opacities.

Underlying pathology could be obscured.

No definite pleural effusion or pneumothorax is identified.



Heart and mediastinum:  

The cardiac silhouette and central pulmonary vasculature appear(s) increase in

size.



Bones and soft tissues:  

Appear unremarkable, given this limited exam.





IMPRESSION: 

Interval increase in volume overload, resulting in central pulmonary vascular

congestion and moderate pulmonary edema.











Signed by: Dr. Angel Dick D.O., M.M.M. on 9/15/2019 7:53 AM

## 2019-09-16 VITALS — DIASTOLIC BLOOD PRESSURE: 90 MMHG | SYSTOLIC BLOOD PRESSURE: 116 MMHG

## 2019-09-16 VITALS — SYSTOLIC BLOOD PRESSURE: 102 MMHG | DIASTOLIC BLOOD PRESSURE: 86 MMHG

## 2019-09-16 VITALS — SYSTOLIC BLOOD PRESSURE: 122 MMHG | DIASTOLIC BLOOD PRESSURE: 88 MMHG

## 2019-09-16 VITALS — SYSTOLIC BLOOD PRESSURE: 135 MMHG | DIASTOLIC BLOOD PRESSURE: 117 MMHG

## 2019-09-16 VITALS — SYSTOLIC BLOOD PRESSURE: 112 MMHG | DIASTOLIC BLOOD PRESSURE: 87 MMHG

## 2019-09-16 VITALS — DIASTOLIC BLOOD PRESSURE: 79 MMHG | SYSTOLIC BLOOD PRESSURE: 105 MMHG

## 2019-09-16 VITALS — SYSTOLIC BLOOD PRESSURE: 111 MMHG | DIASTOLIC BLOOD PRESSURE: 82 MMHG

## 2019-09-16 VITALS — SYSTOLIC BLOOD PRESSURE: 107 MMHG | DIASTOLIC BLOOD PRESSURE: 71 MMHG

## 2019-09-16 VITALS — DIASTOLIC BLOOD PRESSURE: 76 MMHG | SYSTOLIC BLOOD PRESSURE: 122 MMHG

## 2019-09-16 VITALS — DIASTOLIC BLOOD PRESSURE: 80 MMHG | SYSTOLIC BLOOD PRESSURE: 119 MMHG

## 2019-09-16 VITALS — SYSTOLIC BLOOD PRESSURE: 122 MMHG | DIASTOLIC BLOOD PRESSURE: 76 MMHG

## 2019-09-16 VITALS — DIASTOLIC BLOOD PRESSURE: 91 MMHG | SYSTOLIC BLOOD PRESSURE: 117 MMHG

## 2019-09-16 VITALS — DIASTOLIC BLOOD PRESSURE: 80 MMHG | SYSTOLIC BLOOD PRESSURE: 126 MMHG

## 2019-09-16 VITALS — DIASTOLIC BLOOD PRESSURE: 78 MMHG | SYSTOLIC BLOOD PRESSURE: 115 MMHG

## 2019-09-16 VITALS — DIASTOLIC BLOOD PRESSURE: 70 MMHG | SYSTOLIC BLOOD PRESSURE: 115 MMHG

## 2019-09-16 VITALS — DIASTOLIC BLOOD PRESSURE: 77 MMHG | SYSTOLIC BLOOD PRESSURE: 97 MMHG

## 2019-09-16 VITALS — SYSTOLIC BLOOD PRESSURE: 114 MMHG | DIASTOLIC BLOOD PRESSURE: 87 MMHG

## 2019-09-16 VITALS — DIASTOLIC BLOOD PRESSURE: 94 MMHG | SYSTOLIC BLOOD PRESSURE: 148 MMHG

## 2019-09-16 VITALS — DIASTOLIC BLOOD PRESSURE: 84 MMHG | SYSTOLIC BLOOD PRESSURE: 103 MMHG

## 2019-09-16 VITALS — SYSTOLIC BLOOD PRESSURE: 140 MMHG | DIASTOLIC BLOOD PRESSURE: 105 MMHG

## 2019-09-16 VITALS — SYSTOLIC BLOOD PRESSURE: 119 MMHG | DIASTOLIC BLOOD PRESSURE: 72 MMHG

## 2019-09-16 VITALS — DIASTOLIC BLOOD PRESSURE: 103 MMHG | SYSTOLIC BLOOD PRESSURE: 137 MMHG

## 2019-09-16 VITALS — SYSTOLIC BLOOD PRESSURE: 102 MMHG | DIASTOLIC BLOOD PRESSURE: 79 MMHG

## 2019-09-16 VITALS — DIASTOLIC BLOOD PRESSURE: 88 MMHG | SYSTOLIC BLOOD PRESSURE: 120 MMHG

## 2019-09-16 VITALS — SYSTOLIC BLOOD PRESSURE: 99 MMHG | DIASTOLIC BLOOD PRESSURE: 78 MMHG

## 2019-09-16 LAB
ANION GAP SERPL CALC-SCNC: 23.1 MMOL/L (ref 8–16)
BASOPHILS # BLD AUTO: 0.1 10*3/UL (ref 0–0.1)
BASOPHILS NFR BLD AUTO: 0.3 % (ref 0–1)
BASOPHILS NFR BLD AUTO: 0.4 % (ref 0–1)
BASOPHILS NFR BLD AUTO: 0.4 % (ref 0–1)
BUN SERPL-MCNC: 39 MG/DL (ref 7–26)
BUN/CREAT SERPL: 14 (ref 6–25)
CALCIUM SERPL-MCNC: 8 MG/DL (ref 8.4–10.2)
CHLORIDE SERPL-SCNC: 94 MMOL/L (ref 98–107)
CO2 SERPL-SCNC: 14 MMOL/L (ref 22–29)
DEPRECATED APTT PLAS QN: 82.5 SECONDS (ref 23.8–35.5)
DEPRECATED INR PLAS: 0
DEPRECATED INR PLAS: 4.22
DEPRECATED NEUTROPHILS # BLD AUTO: 14.5 10*3/UL (ref 2.1–6.9)
DEPRECATED NEUTROPHILS # BLD AUTO: 14.9 10*3/UL (ref 2.1–6.9)
DEPRECATED NEUTROPHILS # BLD AUTO: 15.8 10*3/UL (ref 2.1–6.9)
EGFRCR SERPLBLD CKD-EPI 2021: 16 ML/MIN (ref 60–?)
EOSINOPHIL # BLD AUTO: 0 10*3/UL (ref 0–0.4)
EOSINOPHIL # BLD AUTO: 0 10*3/UL (ref 0–0.4)
EOSINOPHIL # BLD AUTO: 0.2 10*3/UL (ref 0–0.4)
EOSINOPHIL NFR BLD AUTO: 0 % (ref 0–6)
EOSINOPHIL NFR BLD AUTO: 0 % (ref 0–6)
EOSINOPHIL NFR BLD AUTO: 1 % (ref 0–6)
ERYTHROCYTE [DISTWIDTH] IN CORD BLOOD: 17.7 % (ref 11.7–14.4)
ERYTHROCYTE [DISTWIDTH] IN CORD BLOOD: 17.9 % (ref 11.7–14.4)
ERYTHROCYTE [DISTWIDTH] IN CORD BLOOD: 18.6 % (ref 11.7–14.4)
GLUCOSE SERPLBLD-MCNC: 149 MG/DL (ref 74–118)
HCT VFR BLD AUTO: 31.2 % (ref 34.2–44.1)
HCT VFR BLD AUTO: 36.7 % (ref 34.2–44.1)
HCT VFR BLD AUTO: 39.4 % (ref 34.2–44.1)
HGB BLD-MCNC: 10.5 G/DL (ref 12–16)
HGB BLD-MCNC: 12 G/DL (ref 12–16)
HGB BLD-MCNC: 12.8 G/DL (ref 12–16)
LYMPHOCYTES # BLD: 1.1 10*3/UL (ref 1–3.2)
LYMPHOCYTES # BLD: 1.7 10*3/UL (ref 1–3.2)
LYMPHOCYTES # BLD: 1.8 10*3/UL (ref 1–3.2)
LYMPHOCYTES NFR BLD AUTO: 10.1 % (ref 18–39.1)
LYMPHOCYTES NFR BLD AUTO: 6.1 % (ref 18–39.1)
LYMPHOCYTES NFR BLD AUTO: 8.6 % (ref 18–39.1)
LYMPHOCYTES NFR BLD MANUAL: 12 % (ref 19–48)
MCH RBC QN AUTO: 34.4 PG (ref 28–32)
MCH RBC QN AUTO: 34.7 PG (ref 28–32)
MCH RBC QN AUTO: 35.4 PG (ref 28–32)
MCHC RBC AUTO-ENTMCNC: 32.5 G/DL (ref 31–35)
MCHC RBC AUTO-ENTMCNC: 32.7 G/DL (ref 31–35)
MCHC RBC AUTO-ENTMCNC: 33.7 G/DL (ref 31–35)
MCV RBC AUTO: 105.1 FL (ref 81–99)
MCV RBC AUTO: 105.9 FL (ref 81–99)
MCV RBC AUTO: 106.1 FL (ref 81–99)
MONOCYTES # BLD AUTO: 0.6 10*3/UL (ref 0.2–0.8)
MONOCYTES # BLD AUTO: 0.6 10*3/UL (ref 0.2–0.8)
MONOCYTES # BLD AUTO: 0.8 10*3/UL (ref 0.2–0.8)
MONOCYTES NFR BLD AUTO: 3.1 % (ref 4.4–11.3)
MONOCYTES NFR BLD AUTO: 3.5 % (ref 4.4–11.3)
MONOCYTES NFR BLD AUTO: 4.4 % (ref 4.4–11.3)
MONOCYTES NFR BLD MANUAL: 5 % (ref 3.4–9)
NEUTS SEG NFR BLD AUTO: 79.9 % (ref 38.7–80)
NEUTS SEG NFR BLD AUTO: 82 % (ref 38.7–80)
NEUTS SEG NFR BLD AUTO: 82.9 % (ref 38.7–80)
NEUTS SEG NFR BLD MANUAL: 83 % (ref 40–74)
PLAT MORPH BLD: (no result)
PLATELET # BLD AUTO: 19 X10E3/UL (ref 140–360)
PLATELET # BLD AUTO: 21 X10E3/UL (ref 140–360)
PLATELET # BLD AUTO: 30 X10E3/UL (ref 140–360)
PLATELET # BLD EST: (no result) 10*3/UL
POTASSIUM SERPL-SCNC: 5.1 MMOL/L (ref 3.5–5.1)
PROTHROMBIN TIME: 0 SECONDS (ref 11.9–14.5)
PROTHROMBIN TIME: 41.5 SECONDS (ref 11.9–14.5)
RBC # BLD AUTO: 2.97 X10E6/UL (ref 3.6–5.1)
RBC # BLD AUTO: 3.46 X10E6/UL (ref 3.6–5.1)
RBC # BLD AUTO: 3.72 X10E6/UL (ref 3.6–5.1)
RBC MORPH BLD: NORMAL
SODIUM SERPL-SCNC: 126 MMOL/L (ref 136–145)

## 2019-09-16 PROCEDURE — 02HV33Z INSERTION OF INFUSION DEVICE INTO SUPERIOR VENA CAVA, PERCUTANEOUS APPROACH: ICD-10-PCS | Performed by: INTERNAL MEDICINE

## 2019-09-16 PROCEDURE — 30233K1 TRANSFUSION OF NONAUTOLOGOUS FROZEN PLASMA INTO PERIPHERAL VEIN, PERCUTANEOUS APPROACH: ICD-10-PCS

## 2019-09-16 PROCEDURE — 5A1D70Z PERFORMANCE OF URINARY FILTRATION, INTERMITTENT, LESS THAN 6 HOURS PER DAY: ICD-10-PCS

## 2019-09-16 PROCEDURE — 30233P1 TRANSFUSION OF NONAUTOLOGOUS FROZEN RED CELLS INTO PERIPHERAL VEIN, PERCUTANEOUS APPROACH: ICD-10-PCS

## 2019-09-16 PROCEDURE — 30233R1 TRANSFUSION OF NONAUTOLOGOUS PLATELETS INTO PERIPHERAL VEIN, PERCUTANEOUS APPROACH: ICD-10-PCS

## 2019-09-16 PROCEDURE — B5181ZA FLUOROSCOPY OF SUPERIOR VENA CAVA USING LOW OSMOLAR CONTRAST, GUIDANCE: ICD-10-PCS | Performed by: INTERNAL MEDICINE

## 2019-09-16 RX ADMIN — METOPROLOL TARTRATE SCH MG: 50 TABLET, FILM COATED ORAL at 14:00

## 2019-09-16 RX ADMIN — METOPROLOL TARTRATE SCH MG: 1 INJECTION, SOLUTION INTRAVENOUS at 10:00

## 2019-09-16 RX ADMIN — Medication SCH ML: at 08:00

## 2019-09-16 RX ADMIN — Medication SCH ML: at 13:40

## 2019-09-16 RX ADMIN — Medication SCH ML: at 01:13

## 2019-09-16 RX ADMIN — FAMOTIDINE SCH MG: 10 INJECTION, SOLUTION INTRAVENOUS at 04:46

## 2019-09-16 RX ADMIN — METOPROLOL TARTRATE SCH MG: 1 INJECTION, SOLUTION INTRAVENOUS at 06:07

## 2019-09-16 RX ADMIN — ARGATROBAN SCH MLS/HR: 100 INJECTION, SOLUTION INTRAVENOUS at 21:18

## 2019-09-16 RX ADMIN — METOPROLOL TARTRATE SCH MG: 50 TABLET, FILM COATED ORAL at 04:41

## 2019-09-16 RX ADMIN — LEVALBUTEROL HYDROCHLORIDE SCH MG: 0.63 SOLUTION RESPIRATORY (INHALATION) at 13:40

## 2019-09-16 RX ADMIN — LEVALBUTEROL HYDROCHLORIDE SCH MG: 0.63 SOLUTION RESPIRATORY (INHALATION) at 19:30

## 2019-09-16 RX ADMIN — METOPROLOL TARTRATE SCH MG: 1 INJECTION, SOLUTION INTRAVENOUS at 22:14

## 2019-09-16 RX ADMIN — LEVALBUTEROL HYDROCHLORIDE SCH MG: 0.63 SOLUTION RESPIRATORY (INHALATION) at 01:13

## 2019-09-16 RX ADMIN — METOPROLOL TARTRATE SCH MG: 1 INJECTION, SOLUTION INTRAVENOUS at 02:31

## 2019-09-16 RX ADMIN — Medication SCH MLS/HR: at 18:25

## 2019-09-16 RX ADMIN — DEXTROSE MONOHYDRATE SCH MLS/HR: 100 INJECTION, SOLUTION INTRAVENOUS at 16:15

## 2019-09-16 RX ADMIN — WATER SCH MLS/HR: 1 INJECTION INTRAVENOUS at 09:30

## 2019-09-16 RX ADMIN — METOPROLOL TARTRATE SCH MG: 1 INJECTION, SOLUTION INTRAVENOUS at 14:00

## 2019-09-16 RX ADMIN — METOPROLOL TARTRATE SCH MG: 50 TABLET, FILM COATED ORAL at 22:00

## 2019-09-16 RX ADMIN — Medication SCH MLS/HR: at 01:23

## 2019-09-16 RX ADMIN — Medication SCH ML: at 19:30

## 2019-09-16 RX ADMIN — PHENYTOIN SODIUM SCH MG: 50 INJECTION INTRAMUSCULAR; INTRAVENOUS at 22:14

## 2019-09-16 RX ADMIN — CASTOR OIL AND BALSAM, PERU SCH GM: 788; 87 OINTMENT TOPICAL at 09:04

## 2019-09-16 RX ADMIN — SODIUM CHLORIDE SCH MG: 900 INJECTION INTRAVENOUS at 09:30

## 2019-09-16 RX ADMIN — SODIUM CHLORIDE SCH MLS/HR: 900 INJECTION INTRAVENOUS at 22:51

## 2019-09-16 RX ADMIN — SODIUM CHLORIDE SCH MLS/HR: 900 INJECTION INTRAVENOUS at 22:14

## 2019-09-16 RX ADMIN — METOPROLOL TARTRATE SCH MG: 1 INJECTION, SOLUTION INTRAVENOUS at 18:00

## 2019-09-16 RX ADMIN — METHYLPREDNISOLONE SODIUM SUCCINATE SCH MG: 125 INJECTION, POWDER, FOR SOLUTION INTRAMUSCULAR; INTRAVENOUS at 22:18

## 2019-09-16 RX ADMIN — LEVALBUTEROL HYDROCHLORIDE SCH MG: 0.63 SOLUTION RESPIRATORY (INHALATION) at 08:00

## 2019-09-16 NOTE — DIAGNOSTIC IMAGING REPORT
PROCEDURE: Non-tunneled central venous catheter placement



Procedural Personnel

Attending physician(s): Kelechi Mendez

Fellow physician(s): None

Resident physician(s): None

Advanced practice provider(s): None



Pre-procedure diagnosis: Acute kidney injury

Post-procedure diagnosis: Same

Indication: Performance of hemodialysis

Additional clinical history: None



Complications: No immediate complications.



IMPRESSION:



Insertion of left-sided non-tunneled triple lumen temporary dialysis catheter. 



Plan: 

Post-placement chest radiograph to follow to document positioning.

_______________________________________________________________



PROCEDURE SUMMARY:

- Venous access with ultrasound guidance

- Non-tunneled temporary trialysis catheter insertion with fluoroscopic

guidance

- Additional procedure(s): None



PROCEDURE DETAILS:



Pre-procedure

Consent: Informed consent for the procedure including risks, benefits and

alternatives was obtained and time-out was performed prior to the procedure.

Preparation (MIPS): The site was prepared and draped using all elements of

maximal sterile barrier technique including sterile gloves, sterile gown, cap,

mask, large sterile sheet, sterile ultrasound probe cover, hand hygiene and

cutaneous antisepsis with 2% chlorhexidine. 

Medical reason for site preparation exception (MIPS): Not applicable



Anesthesia/sedation

Level of anesthesia/sedation: No sedation

Anesthesia/sedation administered by: Independent trained observer under

attending supervision with continuous monitoring of the patient?s level of

consciousness and physiologic status



Access

Local anesthesia was administered. The vessel was sonographically evaluated and

determined to be patent. Real time ultrasound was used to visualize needle

entry into the vessel and a permanent image was stored.

Vein accessed: Internal jugular vein

Access technique: Micropuncture set with 21 gauge needle



Catheter placement

The access site was dilated and the catheter was placed into the vein over a

wire  under fluoroscopic guidance.  The catheter tip location was

fluoroscopically verified and a permanent image was stored.. A sterile dressing

was applied.

Catheter placed: Bard triple lumen central venous catheter

Catheter size (Kazakh): 13

Catheter length (cm): 20

Catheter flush: Normal saline

Catheter securement technique: Non-absorbable suture



Contrast

Contrast agent: None



Radiation Dose:

None



Additional Details

Additional description of procedure: None

Equipment details: None

Specimens removed: None

Estimated blood loss (mL): Less than 10

Standardized report: [email or web address]



Attestation

Signer name: Kelechi Mendez MD

I attest that I was present for the entire procedure. I reviewed the stored

images and agree with the report as written.



Signed by: Kelechi Mendez MD on 9/16/2019 5:06 PM

## 2019-09-16 NOTE — NUR
Call placed for Dr Live to inform of low platelet count and PTT still elevated, Heparin gtt 
on hold.  Dr Hood on call, awaiting return call

## 2019-09-16 NOTE — DIAGNOSTIC IMAGING REPORT
PROCEDURE: Non-tunneled central venous catheter placement



Procedural Personnel

Attending physician(s): Kelechi Mendez

Fellow physician(s): None

Resident physician(s): None

Advanced practice provider(s): None



Pre-procedure diagnosis: Acute kidney injury

Post-procedure diagnosis: Same

Indication: Performance of hemodialysis

Additional clinical history: None



Complications: No immediate complications.



IMPRESSION:



Insertion of left-sided non-tunneled triple lumen temporary dialysis catheter. 



Plan: 

Post-placement chest radiograph to follow to document positioning.

_______________________________________________________________



PROCEDURE SUMMARY:

- Venous access with ultrasound guidance

- Non-tunneled temporary trialysis catheter insertion with fluoroscopic

guidance

- Additional procedure(s): None



PROCEDURE DETAILS:



Pre-procedure

Consent: Informed consent for the procedure including risks, benefits and

alternatives was obtained and time-out was performed prior to the procedure.

Preparation (MIPS): The site was prepared and draped using all elements of

maximal sterile barrier technique including sterile gloves, sterile gown, cap,

mask, large sterile sheet, sterile ultrasound probe cover, hand hygiene and

cutaneous antisepsis with 2% chlorhexidine. 

Medical reason for site preparation exception (MIPS): Not applicable



Anesthesia/sedation

Level of anesthesia/sedation: No sedation

Anesthesia/sedation administered by: Independent trained observer under

attending supervision with continuous monitoring of the patient?s level of

consciousness and physiologic status



Access

Local anesthesia was administered. The vessel was sonographically evaluated and

determined to be patent. Real time ultrasound was used to visualize needle

entry into the vessel and a permanent image was stored.

Vein accessed: Internal jugular vein

Access technique: Micropuncture set with 21 gauge needle



Catheter placement

The access site was dilated and the catheter was placed into the vein over a

wire  under fluoroscopic guidance.  The catheter tip location was

fluoroscopically verified and a permanent image was stored.. A sterile dressing

was applied.

Catheter placed: Bard triple lumen central venous catheter

Catheter size (Divehi): 13

Catheter length (cm): 20

Catheter flush: Normal saline

Catheter securement technique: Non-absorbable suture



Contrast

Contrast agent: None



Radiation Dose:

None



Additional Details

Additional description of procedure: None

Equipment details: None

Specimens removed: None

Estimated blood loss (mL): Less than 10

Standardized report: [email or web address]



Attestation

Signer name: Kelechi Mendez MD

I attest that I was present for the entire procedure. I reviewed the stored

images and agree with the report as written.



Signed by: Kelechi Mendez MD on 9/16/2019 5:06 PM

## 2019-09-16 NOTE — DIAGNOSTIC IMAGING REPORT
EXAMINATION:  CHEST XRAY LINE PLACEMENT    



INDICATION: Line placement



COMPARISON: Chest radiograph of earlier the same day.

     

FINDINGS:



LINES/TUBES:Interval placement of new left IJ nontunneled trialysis catheter

terminating just beyond the superior cavoatrial junction. Right IJ nontunneled

central venous catheter terminates at the superior cavoatrial junction. EKG

leads overlie the chest.



LUNGS:The lungs are moderately inflated. There is perihilar fullness and

indistinctness of the pulmonary vasculature.



PLEURA:Small circumferential right pleural effusion, unchanged. No

pneumothorax.



MEDIASTINUM:Cardiomediastinal silhouette is stably enlarged.



BONES/SOFT TISSUES:No acute osseous injury.



ABDOMEN:No free air under the diaphragm.





IMPRESSION: 

Interval placement of left IJ nontunneled trialysis catheter which terminates

just beyond the superior cavoatrial junction. The line is ready for immediate

use.



Left IJ central venous catheter unchanged.



Unchanged pulmonary edema and small right circumferential pleural effusion.

Unchanged cardiomegaly.



Signed by: Kelechi Mendez MD on 9/16/2019 4:30 PM

## 2019-09-16 NOTE — NUR
am labs called to dr. pedraza. new orders received and read back verified with MD.  UOP low.  
rectal tube placed for medication via enema.  enema given no difficulty.



patient eccymotic all over body.  slight blood tinge from rectal after rectal tube placed 
but no further bleeding noted from site.  vital signs remained unchanged throughout day.

dr. krishnamurthy updated and type and screen done.  consult for dr. zuniga called to office. 

per dr. leavitt at bedside mid afternoon, orders for 1 jumbo ffp and 2 jumbo platelets to give. 
 ffp transfused.  no s/s reaction noted. vital signs unchanged.  frequent log of vital signs 
in chart.  

IR placed left trialysis catheter without difficulty.  no s/s bleeding from site post 
procedure.  HD called for stat HD at 1555. started HD around 1800. nsg report given to hs 
RN.

## 2019-09-16 NOTE — DIAGNOSTIC IMAGING REPORT
PROCEDURE: Non-tunneled central venous catheter placement



Procedural Personnel

Attending physician(s): Kelechi Mendez

Fellow physician(s): None

Resident physician(s): None

Advanced practice provider(s): None



Pre-procedure diagnosis: Acute kidney injury

Post-procedure diagnosis: Same

Indication: Performance of hemodialysis

Additional clinical history: None



Complications: No immediate complications.



IMPRESSION:



Insertion of left-sided non-tunneled triple lumen temporary dialysis catheter. 



Plan: 

Post-placement chest radiograph to follow to document positioning.

_______________________________________________________________



PROCEDURE SUMMARY:

- Venous access with ultrasound guidance

- Non-tunneled temporary trialysis catheter insertion with fluoroscopic

guidance

- Additional procedure(s): None



PROCEDURE DETAILS:



Pre-procedure

Consent: Informed consent for the procedure including risks, benefits and

alternatives was obtained and time-out was performed prior to the procedure.

Preparation (MIPS): The site was prepared and draped using all elements of

maximal sterile barrier technique including sterile gloves, sterile gown, cap,

mask, large sterile sheet, sterile ultrasound probe cover, hand hygiene and

cutaneous antisepsis with 2% chlorhexidine. 

Medical reason for site preparation exception (MIPS): Not applicable



Anesthesia/sedation

Level of anesthesia/sedation: No sedation

Anesthesia/sedation administered by: Independent trained observer under

attending supervision with continuous monitoring of the patient?s level of

consciousness and physiologic status



Access

Local anesthesia was administered. The vessel was sonographically evaluated and

determined to be patent. Real time ultrasound was used to visualize needle

entry into the vessel and a permanent image was stored.

Vein accessed: Internal jugular vein

Access technique: Micropuncture set with 21 gauge needle



Catheter placement

The access site was dilated and the catheter was placed into the vein over a

wire  under fluoroscopic guidance.  The catheter tip location was

fluoroscopically verified and a permanent image was stored.. A sterile dressing

was applied.

Catheter placed: Bard triple lumen central venous catheter

Catheter size (Syriac): 13

Catheter length (cm): 20

Catheter flush: Normal saline

Catheter securement technique: Non-absorbable suture



Contrast

Contrast agent: None



Radiation Dose:

None



Additional Details

Additional description of procedure: None

Equipment details: None

Specimens removed: None

Estimated blood loss (mL): Less than 10

Standardized report: [email or web address]



Attestation

Signer name: Kelechi Mendez MD

I attest that I was present for the entire procedure. I reviewed the stored

images and agree with the report as written.



Signed by: Kelechi Mendez MD on 9/16/2019 5:06 PM

## 2019-09-16 NOTE — DIAGNOSTIC IMAGING REPORT
PROCEDURE: Non-tunneled central venous catheter placement



Procedural Personnel

Attending physician(s): Kelechi Mendez

Fellow physician(s): None

Resident physician(s): None

Advanced practice provider(s): None



Pre-procedure diagnosis: Acute kidney injury

Post-procedure diagnosis: Same

Indication: Performance of hemodialysis

Additional clinical history: None



Complications: No immediate complications.



IMPRESSION:



Insertion of left-sided non-tunneled triple lumen temporary dialysis catheter. 



Plan: 

Post-placement chest radiograph to follow to document positioning.

_______________________________________________________________



PROCEDURE SUMMARY:

- Venous access with ultrasound guidance

- Non-tunneled temporary trialysis catheter insertion with fluoroscopic

guidance

- Additional procedure(s): None



PROCEDURE DETAILS:



Pre-procedure

Consent: Informed consent for the procedure including risks, benefits and

alternatives was obtained and time-out was performed prior to the procedure.

Preparation (MIPS): The site was prepared and draped using all elements of

maximal sterile barrier technique including sterile gloves, sterile gown, cap,

mask, large sterile sheet, sterile ultrasound probe cover, hand hygiene and

cutaneous antisepsis with 2% chlorhexidine. 

Medical reason for site preparation exception (MIPS): Not applicable



Anesthesia/sedation

Level of anesthesia/sedation: No sedation

Anesthesia/sedation administered by: Independent trained observer under

attending supervision with continuous monitoring of the patient?s level of

consciousness and physiologic status



Access

Local anesthesia was administered. The vessel was sonographically evaluated and

determined to be patent. Real time ultrasound was used to visualize needle

entry into the vessel and a permanent image was stored.

Vein accessed: Internal jugular vein

Access technique: Micropuncture set with 21 gauge needle



Catheter placement

The access site was dilated and the catheter was placed into the vein over a

wire  under fluoroscopic guidance.  The catheter tip location was

fluoroscopically verified and a permanent image was stored.. A sterile dressing

was applied.

Catheter placed: Bard triple lumen central venous catheter

Catheter size (Romanian): 13

Catheter length (cm): 20

Catheter flush: Normal saline

Catheter securement technique: Non-absorbable suture



Contrast

Contrast agent: None



Radiation Dose:

None



Additional Details

Additional description of procedure: None

Equipment details: None

Specimens removed: None

Estimated blood loss (mL): Less than 10

Standardized report: [email or web address]



Attestation

Signer name: Kelechi Mendez MD

I attest that I was present for the entire procedure. I reviewed the stored

images and agree with the report as written.



Signed by: Kelechi Mendez MD on 9/16/2019 5:06 PM

## 2019-09-16 NOTE — NUR
ST NOTE:



Attempted to see pt for MBS readiness.  Pt currently on bi-pap and unable to come off for 
objective swallow evaluation.  Will follow up tomorrow for st status and MBS readiness.  Pt 
to get dobhoff placed.  Handoff to CRUZ Meyers

## 2019-09-17 VITALS — DIASTOLIC BLOOD PRESSURE: 82 MMHG | SYSTOLIC BLOOD PRESSURE: 128 MMHG

## 2019-09-17 VITALS — SYSTOLIC BLOOD PRESSURE: 114 MMHG | DIASTOLIC BLOOD PRESSURE: 88 MMHG

## 2019-09-17 VITALS — SYSTOLIC BLOOD PRESSURE: 128 MMHG | DIASTOLIC BLOOD PRESSURE: 71 MMHG

## 2019-09-17 VITALS — DIASTOLIC BLOOD PRESSURE: 63 MMHG | SYSTOLIC BLOOD PRESSURE: 111 MMHG

## 2019-09-17 VITALS — DIASTOLIC BLOOD PRESSURE: 86 MMHG | SYSTOLIC BLOOD PRESSURE: 117 MMHG

## 2019-09-17 VITALS — SYSTOLIC BLOOD PRESSURE: 108 MMHG | DIASTOLIC BLOOD PRESSURE: 73 MMHG

## 2019-09-17 VITALS — SYSTOLIC BLOOD PRESSURE: 110 MMHG | DIASTOLIC BLOOD PRESSURE: 91 MMHG

## 2019-09-17 VITALS — SYSTOLIC BLOOD PRESSURE: 124 MMHG | DIASTOLIC BLOOD PRESSURE: 84 MMHG

## 2019-09-17 VITALS — DIASTOLIC BLOOD PRESSURE: 76 MMHG | SYSTOLIC BLOOD PRESSURE: 114 MMHG

## 2019-09-17 VITALS — DIASTOLIC BLOOD PRESSURE: 90 MMHG | SYSTOLIC BLOOD PRESSURE: 116 MMHG

## 2019-09-17 VITALS — SYSTOLIC BLOOD PRESSURE: 134 MMHG | DIASTOLIC BLOOD PRESSURE: 73 MMHG

## 2019-09-17 VITALS — DIASTOLIC BLOOD PRESSURE: 83 MMHG | SYSTOLIC BLOOD PRESSURE: 110 MMHG

## 2019-09-17 VITALS — SYSTOLIC BLOOD PRESSURE: 111 MMHG | DIASTOLIC BLOOD PRESSURE: 88 MMHG

## 2019-09-17 VITALS — SYSTOLIC BLOOD PRESSURE: 114 MMHG | DIASTOLIC BLOOD PRESSURE: 91 MMHG

## 2019-09-17 VITALS — DIASTOLIC BLOOD PRESSURE: 88 MMHG | SYSTOLIC BLOOD PRESSURE: 111 MMHG

## 2019-09-17 VITALS — DIASTOLIC BLOOD PRESSURE: 83 MMHG | SYSTOLIC BLOOD PRESSURE: 120 MMHG

## 2019-09-17 VITALS — DIASTOLIC BLOOD PRESSURE: 94 MMHG | SYSTOLIC BLOOD PRESSURE: 107 MMHG

## 2019-09-17 VITALS — SYSTOLIC BLOOD PRESSURE: 115 MMHG | DIASTOLIC BLOOD PRESSURE: 98 MMHG

## 2019-09-17 VITALS — DIASTOLIC BLOOD PRESSURE: 78 MMHG | SYSTOLIC BLOOD PRESSURE: 124 MMHG

## 2019-09-17 VITALS — DIASTOLIC BLOOD PRESSURE: 69 MMHG | SYSTOLIC BLOOD PRESSURE: 122 MMHG

## 2019-09-17 VITALS — DIASTOLIC BLOOD PRESSURE: 92 MMHG | SYSTOLIC BLOOD PRESSURE: 124 MMHG

## 2019-09-17 VITALS — SYSTOLIC BLOOD PRESSURE: 121 MMHG | DIASTOLIC BLOOD PRESSURE: 81 MMHG

## 2019-09-17 VITALS — DIASTOLIC BLOOD PRESSURE: 69 MMHG | SYSTOLIC BLOOD PRESSURE: 100 MMHG

## 2019-09-17 LAB
ALBUMIN SERPL-MCNC: 2.5 G/DL (ref 3.5–5)
ALBUMIN/GLOB SERPL: 0.9 {RATIO} (ref 0.8–2)
ALP SERPL-CCNC: 75 IU/L (ref 40–150)
ALT SERPL-CCNC: 419 IU/L (ref 0–55)
ANION GAP SERPL CALC-SCNC: 21.1 MMOL/L (ref 8–16)
BASE EXCESS BLDA CALC-SCNC: 1 MMOL/L (ref -2–3)
BASOPHILS # BLD AUTO: 0 10*3/UL (ref 0–0.1)
BASOPHILS NFR BLD AUTO: 0.2 % (ref 0–1)
BUN SERPL-MCNC: 33 MG/DL (ref 7–26)
BUN/CREAT SERPL: 11 (ref 6–25)
CALCIUM SERPL-MCNC: 8.5 MG/DL (ref 8.4–10.2)
CHLORIDE SERPL-SCNC: 92 MMOL/L (ref 98–107)
CO2 SERPL-SCNC: 21 MMOL/L (ref 22–29)
DEPRECATED APTT PLAS QN: 125.6 SECONDS (ref 23.8–35.5)
DEPRECATED INR PLAS: (no result)
DEPRECATED INR PLAS: (no result)
DEPRECATED INR PLAS: 14.77
DEPRECATED NEUTROPHILS # BLD AUTO: 15.3 10*3/UL (ref 2.1–6.9)
DEPRECATED PHOSPHATE SERPL-MCNC: 3.8 MG/DL (ref 2.3–4.7)
EGFRCR SERPLBLD CKD-EPI 2021: 16 ML/MIN (ref 60–?)
EOSINOPHIL # BLD AUTO: 0.1 10*3/UL (ref 0–0.4)
EOSINOPHIL NFR BLD AUTO: 0.5 % (ref 0–6)
ERYTHROCYTE [DISTWIDTH] IN CORD BLOOD: 17.7 % (ref 11.7–14.4)
FIBRINOGEN PPP COAG.DERIVED-MCNC: 70 MG/DL (ref 204–462)
GLOBULIN PLAS-MCNC: 2.8 G/DL (ref 2.3–3.5)
GLUCOSE SERPLBLD-MCNC: 129 MG/DL (ref 74–118)
HCO3 BLDA-SCNC: 23 MMOL/L (ref 23–28)
HCT VFR BLD AUTO: 29.4 % (ref 34.2–44.1)
HGB BLD-MCNC: 9.5 G/DL (ref 12–16)
LYMPHOCYTES # BLD: 0.3 10*3/UL (ref 1–3.2)
LYMPHOCYTES NFR BLD AUTO: 2 % (ref 18–39.1)
LYMPHOCYTES NFR BLD MANUAL: 4 % (ref 19–48)
MAGNESIUM SERPL-MCNC: 1.6 MG/DL (ref 1.3–2.1)
MCH RBC QN AUTO: 34.2 PG (ref 28–32)
MCHC RBC AUTO-ENTMCNC: 32.3 G/DL (ref 31–35)
MCV RBC AUTO: 105.8 FL (ref 81–99)
MONOCYTES # BLD AUTO: 0.5 10*3/UL (ref 0.2–0.8)
MONOCYTES NFR BLD AUTO: 2.6 % (ref 4.4–11.3)
MONOCYTES NFR BLD MANUAL: 3 % (ref 3.4–9)
NEUTS SEG NFR BLD AUTO: 90.1 % (ref 38.7–80)
NEUTS SEG NFR BLD MANUAL: 93 % (ref 40–74)
PCO2 BLDA: 144 MMHG (ref 80–105)
PCO2 BLDA: 25 MMHG (ref 41–51)
PH BLDA: 7.58 [PH] (ref 7.31–7.41)
PLAT MORPH BLD: (no result)
PLATELET # BLD AUTO: 131 X10E3/UL (ref 140–360)
PLATELET # BLD EST: (no result) 10*3/UL
POTASSIUM SERPL-SCNC: 4.1 MMOL/L (ref 3.5–5.1)
PROTHROMBIN TIME: 108.7 SECONDS (ref 11.9–14.5)
PROTHROMBIN TIME: > 100 SECONDS (ref 11.9–14.5)
PROTHROMBIN TIME: > 100 SECONDS (ref 11.9–14.5)
RBC # BLD AUTO: 2.78 X10E6/UL (ref 3.6–5.1)
RBC MORPH BLD: NORMAL
SAO2 % BLDA: 100 % (ref 95–98)
SODIUM SERPL-SCNC: 130 MMOL/L (ref 136–145)

## 2019-09-17 PROCEDURE — 5A1D70Z PERFORMANCE OF URINARY FILTRATION, INTERMITTENT, LESS THAN 6 HOURS PER DAY: ICD-10-PCS

## 2019-09-17 RX ADMIN — TAZOBACTAM SODIUM AND PIPERACILLIN SODIUM SCH MLS/HR: 250; 2 INJECTION, SOLUTION INTRAVENOUS at 21:59

## 2019-09-17 RX ADMIN — LEVALBUTEROL HYDROCHLORIDE SCH MG: 0.63 SOLUTION RESPIRATORY (INHALATION) at 07:30

## 2019-09-17 RX ADMIN — METOPROLOL TARTRATE SCH MG: 50 TABLET, FILM COATED ORAL at 05:29

## 2019-09-17 RX ADMIN — PHENYTOIN SODIUM SCH MG: 50 INJECTION INTRAMUSCULAR; INTRAVENOUS at 20:51

## 2019-09-17 RX ADMIN — CASTOR OIL AND BALSAM, PERU SCH GM: 788; 87 OINTMENT TOPICAL at 11:44

## 2019-09-17 RX ADMIN — Medication SCH ML: at 00:47

## 2019-09-17 RX ADMIN — METOPROLOL TARTRATE SCH MG: 1 INJECTION, SOLUTION INTRAVENOUS at 05:29

## 2019-09-17 RX ADMIN — METOPROLOL TARTRATE SCH MG: 1 INJECTION, SOLUTION INTRAVENOUS at 14:00

## 2019-09-17 RX ADMIN — LEVALBUTEROL HYDROCHLORIDE SCH MG: 0.63 SOLUTION RESPIRATORY (INHALATION) at 00:47

## 2019-09-17 RX ADMIN — Medication SCH ML: at 19:18

## 2019-09-17 RX ADMIN — TAZOBACTAM SODIUM AND PIPERACILLIN SODIUM SCH MLS/HR: 250; 2 INJECTION, SOLUTION INTRAVENOUS at 15:52

## 2019-09-17 RX ADMIN — METOPROLOL TARTRATE SCH MG: 1 INJECTION, SOLUTION INTRAVENOUS at 10:00

## 2019-09-17 RX ADMIN — SODIUM CHLORIDE SCH MLS/HR: 900 INJECTION INTRAVENOUS at 14:00

## 2019-09-17 RX ADMIN — LEVALBUTEROL HYDROCHLORIDE SCH MG: 0.63 SOLUTION RESPIRATORY (INHALATION) at 19:18

## 2019-09-17 RX ADMIN — METOPROLOL TARTRATE SCH MG: 1 INJECTION, SOLUTION INTRAVENOUS at 02:12

## 2019-09-17 RX ADMIN — METOPROLOL TARTRATE SCH MG: 1 INJECTION, SOLUTION INTRAVENOUS at 18:22

## 2019-09-17 RX ADMIN — ARGATROBAN SCH MLS/HR: 100 INJECTION, SOLUTION INTRAVENOUS at 19:45

## 2019-09-17 RX ADMIN — SODIUM CHLORIDE SCH MG: 900 INJECTION INTRAVENOUS at 11:44

## 2019-09-17 RX ADMIN — METOPROLOL TARTRATE SCH MG: 1 INJECTION, SOLUTION INTRAVENOUS at 21:59

## 2019-09-17 RX ADMIN — METOPROLOL TARTRATE SCH MG: 50 TABLET, FILM COATED ORAL at 14:00

## 2019-09-17 RX ADMIN — WATER SCH MLS/HR: 1 INJECTION INTRAVENOUS at 20:57

## 2019-09-17 RX ADMIN — Medication SCH ML: at 13:25

## 2019-09-17 RX ADMIN — METHYLPREDNISOLONE SODIUM SUCCINATE SCH MG: 125 INJECTION, POWDER, FOR SOLUTION INTRAMUSCULAR; INTRAVENOUS at 21:59

## 2019-09-17 RX ADMIN — Medication SCH ML: at 07:30

## 2019-09-17 RX ADMIN — LEVALBUTEROL HYDROCHLORIDE SCH MG: 0.63 SOLUTION RESPIRATORY (INHALATION) at 13:25

## 2019-09-17 RX ADMIN — SODIUM CHLORIDE SCH MLS/HR: 900 INJECTION INTRAVENOUS at 20:51

## 2019-09-17 RX ADMIN — METHYLPREDNISOLONE SODIUM SUCCINATE SCH MG: 125 INJECTION, POWDER, FOR SOLUTION INTRAMUSCULAR; INTRAVENOUS at 14:45

## 2019-09-17 RX ADMIN — METHYLPREDNISOLONE SODIUM SUCCINATE SCH MG: 125 INJECTION, POWDER, FOR SOLUTION INTRAMUSCULAR; INTRAVENOUS at 05:29

## 2019-09-17 NOTE — NUR
family noted patient became apneic without any stimulus on hiflo vapotherm therapy.  patient 
began breathing upon stimulation, rapid deep breaths then settled down and became apniec 
again after several minutes.  these episode occurred several times with multiple witnesses 
by family and staff.  patient placed on bipap, tital volumes ranged from 75 to 675.  rr 
22-35bpm,  sats 100%  no cyanosis noted. pt not following commands or opening eyes. patient 
opened eyes with physical stimulus. Dr. Rossi notified and gave new order for abg. 
repiratory drawing abg.

## 2019-09-17 NOTE — NUR
dr. richards at bedside earlier, spoke with daughter and cousin about possible intubation of 
patient if/when needed. daughter did not give answer instead stated she wanted to speak with 
sister about intubation.

## 2019-09-17 NOTE — NUR
PTT ELEVATED, ARGATROBAN DRIP STOPPED PER PROTOCOL.  CALL PLACED FOR DR FIGUEROA, NO ANSWER 
- WILL CONTINUE TO ATTEMPT TO CONTACT

## 2019-09-17 NOTE — NUR
STILL UNABLE TO CONTACT DR FIGUEROA, DR CARVER AT BEDSIDE.  WILL KEEP ARGATROBAN ON HOLD 
UNTIL WE CAN REACH DR FIGUEROA FOR FURTHER ORDERS

## 2019-09-17 NOTE — NUR
CM MET W DTR AT THE PT'S BEDSIDE TO PROVIDE CHOICE.  

STAT[place] SHE ONLY [facility] [place].  

CHOICE LETTER WAS SIGNED AND COPY TO DTR AND COPY TO THE CHART.

MOT INITIATED; HOUSE SUPER RECEIVED/TIA

## 2019-09-17 NOTE — PROGRESS NOTE
DATE:  09/16/2019

 

Cardiology Progress Note 

 

SUBJECTIVE:  The patient remains confused.  She is on BiPAP and amiodarone drip.

 

OBJECTIVE:  VITAL SIGNS:  Temperature 97 degrees, pulse 92, respiratory rate 33, blood

pressure 97/77, oxygen saturation 100% on BiPAP. 

GENERAL:  Elderly woman, chronically ill-appearing, frail, in no acute distress,

confused. 

LUNGS:  Clear to auscultation in anterior lung fields, on BiPAP. 

CARDIOVASCULAR:  Irregularly irregular, tachycardic.  No murmur. 

ABDOMEN:  Soft, nontender. 

EXTREMITIES:  Edematous.

 

CARDIAC MEDICATIONS:  Argatroban drip and amiodarone drip.

 

LABORATORY DATA:  Sodium 126, potassium 5.1, chloride 94, CO2 of 14, BUN 39, and

creatinine 2.8.  WBC 19.19, hemoglobin 10.5, hematocrit 31.2, and platelets 19. 

 

TELEMETRY:  Atrial fibrillation with rapid ventricular response.

 

IMPRESSION:  

1. Atrial fibrillation.

2. Elevated troponin.

3. Acute-on-chronic kidney disease.

4. Ischemic infarct on MRI of the brain.

5. Thrombocytopenia.

6. Acute systolic heart failure.

7. Seizures.

8. Liver injury, resolved.

9. Hyponatremia.

10. Hyperlipidemia.

11. Mitral regurgitation.

12. Pneumonia.

 

RECOMMENDATIONS:  Continue amiodarone drip.  Stop heparin given thrombocytopenia.  Send

HIT panel.  Monitor the patient closely on telemetry.  The patient remains confused.

Keep the patient on IV medications at this time.  If the patient improves clinically,

she will need ischemic evaluation prior to discharge.  Continue supportive care.

Antibiotics per Infectious Disease.  Prognosis is poor. 

 

Thank you for this consult.  We will continue to follow.

 

 

 

 

______________________________

Cecilia Vital MD

 

ABS/MODL

D:  09/16/2019 22:19:47

T:  09/17/2019 01:06:53

Job #:  [id number]

## 2019-09-17 NOTE — DIAGNOSTIC IMAGING REPORT
Examination: Single AP view of the chest.



COMPARISON: Portable chest 9/16/2019



INDICATION: Shortness of breath, CHF, atrial fibrillation

    

IMPRESSION:

 

1.  Lines and Tubes: Supporting lines and tubes are unchanged.

2.  Lungs are well-inflated. Slight interval improvement in bilateral

interstitial pulmonary edema. Persistent obscuration of the left hemidiaphragm

and increased left retrocardiac density, likely reflecting left pleural

effusion with associated atelectasis.

3.  Stable enlargement of the cardiac silhouette.  Central pulmonary venous

congestion.

4.  No acute bony abnormalities.



Signed by: Dr. Manuel Stanley M.D. on 9/17/2019 7:11 AM

## 2019-09-17 NOTE — PROGRESS NOTE
DATE:  09/17/2019  

 

SUBJECTIVE:  The patient yesterday after I did my rounds, routine laboratory came back

showing a platelet count significantly low where as the day's before that were normal,

so the patient was concerned for possible DIC versus HIT, so the heparin was

discontinued.  She was consulted with Dr. Giraldo and the patient then had a

hemodialysis catheter placed for dialysis to remove off the extra volume fluid, which

went well without any complications.  The patient did start having some increased

bruising and oozing from the thrombocytopenia. Unfortunately, due to a clerical error as

I was told the patient received the wrong blood product for her FFP, where she is A

positive and received O positive blood due to apparently a clerical error.  Once this

was then identified, Dr. Giraldo was at the bedside to help manage the patient.

Administration was present as well aware of the mistake happening with the clerical

issue, luckily the patient did not have any acute events associated to the

incompatibility.  I voiced my extreme frustrations to the director of nursing about the

clerical error in the lab overnight, then the patient was able to subsequently tolerate

her dialysis and had about 2.5 L of fluid removed, overnight the patient did well. 

 

PHYSICAL EXAMINATION:

VITAL SIGNS:  Stable.  She was oxygenating 98% to 100% on BiPAP.  Her current vital

signs are temperature 97.4, pulse 100, blood pressure 124/84, and sats 98% on BiPAP. 

GENERAL:  She does not look in any respiratory distress.  She is able to open her eyes

when I talk to her, but she does not follow any commands. 

CARDIOVASCULAR:  Irregularly irregular. 

LUNGS: Decreased breath sounds bilaterally, but improved airway compared to yesterday. 

ABDOMEN:  Good bowel sounds.  Soft, nontender. 

EXTREMITIES: No clubbing or cyanosis. 

NEURO:  She is moving all extremities x4 spontaneously, but not much.

ASSESSMENT AND PLAN:  

1. Thrombocytopenia.  The concern is for possible disseminated intravascular coagulation

versus heparin-induced thrombocytopenia, so the patient is now off the heparin and the

patient is on argatroban per Dr. Giraldo and her platelet count this morning is already

improved up to 131. 

2. Elevated liver function tests, most likely due to shock aspect because her liver

tests were normal prior to this, so we will continue to monitor at this time. 

3. Acute renal failure.  Continue to monitor.

4. Acute systolic heart failure.  We will have dialysis again today to remove volume off

the patient to help improve with her breathing capabilities. 

5. Atrial fibrillation with rapid ventricular response.  Continue with current care per

Cardiology. 

6. Cerebrovascular accident.  Continue with current care.

7. Leukocytosis. Continue to monitor and continue with her antibiotics with meropenem.

 

DISPOSITION:  I had a long talk with the daughter as well as the boyfriend at the

bedside discussing the current status of the patient as well as the events from last

night and a clerical error that occurred from the lab and due to our discussions prior

to yesterday, the daughter is aware that the patient is critically ill and has multiple

medical problems ongoing as well as multiple involvements of many organs that will make

her very critically ill and of course unsure how the future  has to hold for the

patient.  The patient's daughter is going to have a discussion with her sister about

transferring the patient potentially to [facility], which I said I will be 

happy to honor if that is what they decide on, so they are going to have a family

discussion around noon today and let me know.  Please see hospital chart for full

details. 

 

 

 

 

______________________________

MD ZURDO Trinh/DAISY

D:  09/17/2019 05:28:36

T:  09/17/2019 11:32:45

Job #:  [id number]

## 2019-09-17 NOTE — NUR
ASSESSMENT: Spiritual concern

Pt's daughter, Birdie, worried yet hopeful about mother's illness. Pt's boyfriend, Cirilo, 
also at bedside.



Intervention:  Provided hospitality and empathic listening. Facilitated illness review. 
Reminded pt's family of how to contact , if needed. Provided prayer.



Outcome: Pt's family expressed appreciation for visit and support. Will continue to follow 
as able.



ZORAN POLLARD



Spiritual Care Department

O: [phone]

Pager: [phone] ([phone] from)

## 2019-09-17 NOTE — PROGRESS NOTE
DATE:  09/17/2019

 

Cardiology Progress Note 

 

SUBJECTIVE:  The patient remains confused.  She was started on hemodialysis yesterday

and is currently on high-flow nasal cannula.  Remains on amiodarone 0.5 mg/minute. 

 

OBJECTIVE:  VITAL SIGNS:  Temperature 99 degrees, pulse 97, respiratory rate 17, blood

pressure 116/90, and oxygen saturation 99% on room air. 

GENERAL:  Chronically ill-appearing, elderly, frail woman, in no acute distress,

confused. 

LUNGS:  Decreased breath sounds at the bases.  No wheezes or crackles. 

CARDIOVASCULAR:  Irregularly irregular.  No murmur. 

ABDOMEN:  Soft and nontender. 

EXTREMITIES:  Edematous.

 

CARDIAC MEDICATIONS:  Amiodarone drip 0.5 mg/minute and argatroban drip 5 mg IV q.4

hours. 

 

LABORATORY DATA:  WBC 17.01, hemoglobin 9.5, hematocrit 29.4, and platelets 131.  Sodium

130, potassium 4.1, chloride 92, CO2 21, BUN 33, and creatinine 2.91.   and ALT

419. 

 

TELEMETRY:  Atrial fibrillation.

 

IMPRESSION:  

1. Atrial fibrillation.

2. Acute hypoxic respiratory failure.

3. Thrombocytopenia.

4. Acute-on-chronic kidney disease, now initiated on hemodialysis.

5. Elevated troponin.

6. Ischemic infarct on MRI of the brain.

7. Acute systolic heart failure.

8. Elevated LFTs.

9. Hyperlipidemia.

10. Mitral regurgitation.

11. Seizure.

 

RECOMMENDATIONS:  Elevated LFTs, possibly secondary to congestive hepatopathy versus

shock liver.  Stop amiodarone for now.  We will use metoprolol for rate control.

Heparin has been stopped given thrombocytopenia and the patient has been changed to

argatroban by Hematology.  Platelets have improved.  We will follow up with HIT panel

results.  Continue monitoring the patient closely on telemetry.  If the patient improves

clinically, she will need ischemic evaluation prior to discharge.  Continue supportive

care.  Antibiotics per Infectious Disease.  Prognosis is guarded. 

 

Thank you for this consult.  We will continue to follow.

 

 

 

______________________________

Cecilia Vital MD

 

ABS/MODL

D:  09/17/2019 12:47:09

T:  09/17/2019 14:26:50

Job #:  [id number]

## 2019-09-18 VITALS — SYSTOLIC BLOOD PRESSURE: 115 MMHG | DIASTOLIC BLOOD PRESSURE: 92 MMHG

## 2019-09-18 VITALS — DIASTOLIC BLOOD PRESSURE: 71 MMHG | SYSTOLIC BLOOD PRESSURE: 108 MMHG

## 2019-09-18 VITALS — DIASTOLIC BLOOD PRESSURE: 90 MMHG | SYSTOLIC BLOOD PRESSURE: 111 MMHG

## 2019-09-18 VITALS — DIASTOLIC BLOOD PRESSURE: 67 MMHG | SYSTOLIC BLOOD PRESSURE: 98 MMHG

## 2019-09-18 VITALS — SYSTOLIC BLOOD PRESSURE: 116 MMHG | DIASTOLIC BLOOD PRESSURE: 95 MMHG

## 2019-09-18 VITALS — DIASTOLIC BLOOD PRESSURE: 84 MMHG | SYSTOLIC BLOOD PRESSURE: 105 MMHG

## 2019-09-18 VITALS — DIASTOLIC BLOOD PRESSURE: 77 MMHG | SYSTOLIC BLOOD PRESSURE: 118 MMHG

## 2019-09-18 VITALS — SYSTOLIC BLOOD PRESSURE: 116 MMHG | DIASTOLIC BLOOD PRESSURE: 94 MMHG

## 2019-09-18 VITALS — DIASTOLIC BLOOD PRESSURE: 88 MMHG | SYSTOLIC BLOOD PRESSURE: 109 MMHG

## 2019-09-18 VITALS — DIASTOLIC BLOOD PRESSURE: 100 MMHG | SYSTOLIC BLOOD PRESSURE: 134 MMHG

## 2019-09-18 VITALS — SYSTOLIC BLOOD PRESSURE: 102 MMHG | DIASTOLIC BLOOD PRESSURE: 87 MMHG

## 2019-09-18 VITALS — DIASTOLIC BLOOD PRESSURE: 69 MMHG | SYSTOLIC BLOOD PRESSURE: 117 MMHG

## 2019-09-18 VITALS — DIASTOLIC BLOOD PRESSURE: 78 MMHG | SYSTOLIC BLOOD PRESSURE: 137 MMHG

## 2019-09-18 VITALS — SYSTOLIC BLOOD PRESSURE: 109 MMHG | DIASTOLIC BLOOD PRESSURE: 88 MMHG

## 2019-09-18 VITALS — SYSTOLIC BLOOD PRESSURE: 109 MMHG | DIASTOLIC BLOOD PRESSURE: 87 MMHG

## 2019-09-18 VITALS — SYSTOLIC BLOOD PRESSURE: 122 MMHG | DIASTOLIC BLOOD PRESSURE: 86 MMHG

## 2019-09-18 VITALS — SYSTOLIC BLOOD PRESSURE: 136 MMHG | DIASTOLIC BLOOD PRESSURE: 91 MMHG

## 2019-09-18 VITALS — DIASTOLIC BLOOD PRESSURE: 86 MMHG | SYSTOLIC BLOOD PRESSURE: 122 MMHG

## 2019-09-18 VITALS — SYSTOLIC BLOOD PRESSURE: 113 MMHG | DIASTOLIC BLOOD PRESSURE: 82 MMHG

## 2019-09-18 VITALS — SYSTOLIC BLOOD PRESSURE: 111 MMHG | DIASTOLIC BLOOD PRESSURE: 68 MMHG

## 2019-09-18 VITALS — DIASTOLIC BLOOD PRESSURE: 79 MMHG | SYSTOLIC BLOOD PRESSURE: 111 MMHG

## 2019-09-18 VITALS — SYSTOLIC BLOOD PRESSURE: 107 MMHG | DIASTOLIC BLOOD PRESSURE: 94 MMHG

## 2019-09-18 VITALS — SYSTOLIC BLOOD PRESSURE: 110 MMHG | DIASTOLIC BLOOD PRESSURE: 77 MMHG

## 2019-09-18 VITALS — DIASTOLIC BLOOD PRESSURE: 102 MMHG | SYSTOLIC BLOOD PRESSURE: 117 MMHG

## 2019-09-18 LAB
ALBUMIN SERPL-MCNC: 3.2 G/DL (ref 3.5–5)
ALBUMIN SERPL-MCNC: 3.2 G/DL (ref 3.5–5)
ALBUMIN SERPL-MCNC: 3.8 G/DL (ref 3.5–5)
ALBUMIN/GLOB SERPL: 1.3 {RATIO} (ref 0.8–2)
ALBUMIN/GLOB SERPL: 1.4 {RATIO} (ref 0.8–2)
ALP SERPL-CCNC: 85 IU/L (ref 40–150)
ALP SERPL-CCNC: 88 IU/L (ref 40–150)
ALP SERPL-CCNC: 98 IU/L (ref 40–150)
ALT SERPL-CCNC: 715 IU/L (ref 0–55)
ALT SERPL-CCNC: 750 IU/L (ref 0–55)
ALT SERPL-CCNC: 751 IU/L (ref 0–55)
AMYLASE SERPL-CCNC: 232 U/L (ref 25–125)
ANION GAP SERPL CALC-SCNC: 18.9 MMOL/L (ref 8–16)
ANION GAP SERPL CALC-SCNC: 22.5 MMOL/L (ref 8–16)
ANISOCYTOSIS BLD QL SMEAR: SLIGHT
BASOPHILS # BLD AUTO: 0.1 10*3/UL (ref 0–0.1)
BASOPHILS # BLD AUTO: 0.1 10*3/UL (ref 0–0.1)
BASOPHILS NFR BLD AUTO: 0.4 % (ref 0–1)
BASOPHILS NFR BLD AUTO: 0.4 % (ref 0–1)
BILIRUB CONJ SERPL-MCNC: 3.8 MG/DL (ref 0–0.5)
BUN SERPL-MCNC: 16 MG/DL (ref 7–26)
BUN SERPL-MCNC: 35 MG/DL (ref 7–26)
BUN/CREAT SERPL: 12 (ref 6–25)
BUN/CREAT SERPL: 13 (ref 6–25)
CALCIUM SERPL-MCNC: 8.9 MG/DL (ref 8.4–10.2)
CALCIUM SERPL-MCNC: 9.2 MG/DL (ref 8.4–10.2)
CHLORIDE SERPL-SCNC: 94 MMOL/L (ref 98–107)
CHLORIDE SERPL-SCNC: 96 MMOL/L (ref 98–107)
CO2 SERPL-SCNC: 22 MMOL/L (ref 22–29)
CO2 SERPL-SCNC: 26 MMOL/L (ref 22–29)
DEPRECATED APTT PLAS QN: 109.1 SECONDS (ref 23.8–35.5)
DEPRECATED APTT PLAS QN: 59.3 SECONDS (ref 23.8–35.5)
DEPRECATED INR PLAS: 14.01
DEPRECATED INR PLAS: 2.64
DEPRECATED NEUTROPHILS # BLD AUTO: 11.7 10*3/UL (ref 2.1–6.9)
DEPRECATED NEUTROPHILS # BLD AUTO: 13.6 10*3/UL (ref 2.1–6.9)
DEPRECATED PHOSPHATE SERPL-MCNC: 4.7 MG/DL (ref 2.3–4.7)
EGFRCR SERPLBLD CKD-EPI 2021: 17 ML/MIN (ref 60–?)
EGFRCR SERPLBLD CKD-EPI 2021: 37 ML/MIN (ref 60–?)
EOSINOPHIL # BLD AUTO: 0.1 10*3/UL (ref 0–0.4)
EOSINOPHIL # BLD AUTO: 0.2 10*3/UL (ref 0–0.4)
EOSINOPHIL NFR BLD AUTO: 0.5 % (ref 0–6)
EOSINOPHIL NFR BLD AUTO: 1.2 % (ref 0–6)
EOSINOPHIL NFR BLD MANUAL: 1 % (ref 0–7)
ERYTHROCYTE [DISTWIDTH] IN CORD BLOOD: 18.3 % (ref 11.7–14.4)
ERYTHROCYTE [DISTWIDTH] IN CORD BLOOD: 18.4 % (ref 11.7–14.4)
GLOBULIN PLAS-MCNC: 2.3 G/DL (ref 2.3–3.5)
GLOBULIN PLAS-MCNC: 3 G/DL (ref 2.3–3.5)
GLUCOSE SERPLBLD-MCNC: 105 MG/DL (ref 74–118)
GLUCOSE SERPLBLD-MCNC: 155 MG/DL (ref 74–118)
HCT VFR BLD AUTO: 27.4 % (ref 34.2–44.1)
HCT VFR BLD AUTO: 29.2 % (ref 34.2–44.1)
HGB BLD-MCNC: 9 G/DL (ref 12–16)
HGB BLD-MCNC: 9.2 G/DL (ref 12–16)
HYPOCHROMIA BLD QL SMEAR: SLIGHT
LIPASE SERPL-CCNC: 116 U/L (ref 8–78)
LYMPHOCYTES # BLD: 0.1 10*3/UL (ref 1–3.2)
LYMPHOCYTES # BLD: 0.6 10*3/UL (ref 1–3.2)
LYMPHOCYTES NFR BLD AUTO: 0.8 % (ref 18–39.1)
LYMPHOCYTES NFR BLD AUTO: 4.5 % (ref 18–39.1)
LYMPHOCYTES NFR BLD MANUAL: 11 % (ref 19–48)
LYMPHOCYTES NFR BLD MANUAL: 3 % (ref 19–48)
MACROCYTES BLD QL SMEAR: (no result)
MAGNESIUM SERPL-MCNC: 2 MG/DL (ref 1.3–2.1)
MCH RBC QN AUTO: 34.1 PG (ref 28–32)
MCH RBC QN AUTO: 34.4 PG (ref 28–32)
MCHC RBC AUTO-ENTMCNC: 31.5 G/DL (ref 31–35)
MCHC RBC AUTO-ENTMCNC: 32.8 G/DL (ref 31–35)
MCV RBC AUTO: 104.6 FL (ref 81–99)
MCV RBC AUTO: 108.1 FL (ref 81–99)
MONOCYTES # BLD AUTO: 0.5 10*3/UL (ref 0.2–0.8)
MONOCYTES # BLD AUTO: 0.6 10*3/UL (ref 0.2–0.8)
MONOCYTES NFR BLD AUTO: 3.4 % (ref 4.4–11.3)
MONOCYTES NFR BLD AUTO: 4 % (ref 4.4–11.3)
MONOCYTES NFR BLD MANUAL: 4 % (ref 3.4–9)
MONOCYTES NFR BLD MANUAL: 4 % (ref 3.4–9)
NEUTS SEG NFR BLD AUTO: 85.5 % (ref 38.7–80)
NEUTS SEG NFR BLD AUTO: 88.8 % (ref 38.7–80)
NEUTS SEG NFR BLD MANUAL: 84 % (ref 40–74)
NEUTS SEG NFR BLD MANUAL: 93 % (ref 40–74)
NRBC/RBC NFR BLD MANUAL: 8 %
PLAT MORPH BLD: (no result)
PLAT MORPH BLD: NORMAL
PLATELET # BLD AUTO: 84 X10E3/UL (ref 140–360)
PLATELET # BLD AUTO: 89 X10E3/UL (ref 140–360)
PLATELET # BLD EST: (no result) 10*3/UL
PLATELET # BLD EST: (no result) 10*3/UL
POIKILOCYTOSIS BLD QL SMEAR: SLIGHT
POTASSIUM SERPL-SCNC: 3.9 MMOL/L (ref 3.5–5.1)
POTASSIUM SERPL-SCNC: 4.5 MMOL/L (ref 3.5–5.1)
PROTHROMBIN TIME: 104.4 SECONDS (ref 11.9–14.5)
PROTHROMBIN TIME: 28.9 SECONDS (ref 11.9–14.5)
RBC # BLD AUTO: 2.62 X10E6/UL (ref 3.6–5.1)
RBC # BLD AUTO: 2.7 X10E6/UL (ref 3.6–5.1)
RBC MORPH BLD: NORMAL
RBC MORPH BLD: NORMAL
SODIUM SERPL-SCNC: 134 MMOL/L (ref 136–145)
SODIUM SERPL-SCNC: 137 MMOL/L (ref 136–145)

## 2019-09-18 PROCEDURE — 3E0436Z INTRODUCTION OF NUTRITIONAL SUBSTANCE INTO CENTRAL VEIN, PERCUTANEOUS APPROACH: ICD-10-PCS

## 2019-09-18 PROCEDURE — 5A1D70Z PERFORMANCE OF URINARY FILTRATION, INTERMITTENT, LESS THAN 6 HOURS PER DAY: ICD-10-PCS

## 2019-09-18 RX ADMIN — LEVALBUTEROL HYDROCHLORIDE SCH MG: 0.63 SOLUTION RESPIRATORY (INHALATION) at 07:10

## 2019-09-18 RX ADMIN — LEVALBUTEROL HYDROCHLORIDE SCH MG: 0.63 SOLUTION RESPIRATORY (INHALATION) at 12:40

## 2019-09-18 RX ADMIN — LEVALBUTEROL HYDROCHLORIDE SCH MG: 0.63 SOLUTION RESPIRATORY (INHALATION) at 19:30

## 2019-09-18 RX ADMIN — TAZOBACTAM SODIUM AND PIPERACILLIN SODIUM SCH MLS/HR: 250; 2 INJECTION, SOLUTION INTRAVENOUS at 05:30

## 2019-09-18 RX ADMIN — SODIUM CHLORIDE SCH MG: 900 INJECTION INTRAVENOUS at 17:02

## 2019-09-18 RX ADMIN — METOPROLOL TARTRATE SCH MG: 1 INJECTION, SOLUTION INTRAVENOUS at 17:03

## 2019-09-18 RX ADMIN — Medication SCH ML: at 07:10

## 2019-09-18 RX ADMIN — METOPROLOL TARTRATE SCH MG: 1 INJECTION, SOLUTION INTRAVENOUS at 10:00

## 2019-09-18 RX ADMIN — Medication SCH ML: at 19:30

## 2019-09-18 RX ADMIN — Medication SCH ML: at 12:40

## 2019-09-18 RX ADMIN — METOPROLOL TARTRATE SCH MG: 1 INJECTION, SOLUTION INTRAVENOUS at 14:00

## 2019-09-18 RX ADMIN — CASTOR OIL AND BALSAM, PERU SCH GM: 788; 87 OINTMENT TOPICAL at 17:02

## 2019-09-18 RX ADMIN — METHYLPREDNISOLONE SODIUM SUCCINATE SCH MG: 125 INJECTION, POWDER, FOR SOLUTION INTRAMUSCULAR; INTRAVENOUS at 17:02

## 2019-09-18 RX ADMIN — METOPROLOL TARTRATE SCH MG: 1 INJECTION, SOLUTION INTRAVENOUS at 05:30

## 2019-09-18 RX ADMIN — METHYLPREDNISOLONE SODIUM SUCCINATE SCH MG: 125 INJECTION, POWDER, FOR SOLUTION INTRAMUSCULAR; INTRAVENOUS at 05:30

## 2019-09-18 RX ADMIN — Medication SCH ML: at 01:02

## 2019-09-18 RX ADMIN — METOPROLOL TARTRATE SCH MG: 1 INJECTION, SOLUTION INTRAVENOUS at 02:00

## 2019-09-18 RX ADMIN — LEVALBUTEROL HYDROCHLORIDE SCH MG: 0.63 SOLUTION RESPIRATORY (INHALATION) at 01:02

## 2019-09-18 NOTE — CONSULTATION
DATE OF CONSULTATION:  09/16/2019  

 

HISTORY OF PRESENT ILLNESS:  Serena Sloan is a 74-year-old white female, who has been

referred to me for evaluation of thrombocytopenia.  Most of the history has been

obtained by my personal communication with Dr. Porras and the daughter of the patient, who

is a pediatric nurse.  The patient had presented with atrial fibrillation, shortness of

breath, and swelling of the feet, elevated creatinines.  Subsequently because of the

atrial fibrillation, Cardiology consultation was obtained.  The patient was kept on

heparin. 

 

SOCIAL HISTORY:  Noncontributory.

 

FAMILY HISTORY:  Noncontributory.

 

ALLERGIES:  REPORTED NONE.

 

MEDICATIONS:  At this time:

1. Sodium chloride.

2. Amiodarone.

3. Meropenem.

4. Dextrose.

5. Mannitol.

6. Sodium bicarbonate.

7. Ondansetron.

8. Metoprolol.

9. Hydralazine.

10. Phenytoin.

11. Alteplase.

12. Protonix. 

The patient has also been on heparin.  Going through the consultation of cardiologist on

the same date, the patient was also treated for hypertension.  The patient's history

confirmed by my personal communication with the daughter whether she was treated

recently for cellulitis of both lower extremities.  The cardiologist suggested

amiodarone, heparin, and metoprolol.  Review of consultation of a pulmonologist dated

09/07/2019, suggested that there was evidence of pneumonia along with the possibility of

fluid.  He had suggested ultrasound-guided thoracentesis of the right pleural effusion.

He had also suggested repeating a CT scan in 6 to 8 weeks. 

 

According to his notes, the critical care team had spent approximately 50 minutes with

the patient and the family. 

 

REVIEW OF SYSTEMS:

HEENT:  Normal. 

CARDIAC:  History of atrial fibrillation. 

RESPIRATORY:  COPD. 

GI:  Normal. 

:  Chronic renal failure. 

MUSCULOSKELETAL:  History of cellulitis of both lower extremities. 

NEUROENDOCRINE:  Essentially normal.

 

PHYSICAL EXAMINATION:

VITAL SIGNS:  At the present time on 09/16, when I examined the patient, the patient was

intubated.  Pulse was 110 and irregular, blood pressure was reasonable at 117/91. 

NECK:  No adenopathy. 

HEART:  Tachycardic, irregular. 

LUNGS:  Coarse crepitations. 

BREAST:  Could not be done. 

ABDOMEN:  Soft. 

RECTAL AND VAGINAL:  Could not be done. 

CENTRAL NERVOUS SYSTEM:  Could not be done. 

EXTREMITIES:  Reveals some bruising over both upper and lower extremities.  I could not

see the cellulitis, which she had at that time. 

LAB AND INVESTIGATIONS:  Of interest show a CBC of 09/05/2019, with a hemoglobin of

12.3, hematocrit 36.5, white count of 13,610.  Platelets were reported at 198,000 on

09/06, platelets were 202,000 on 09/07, 207,000 on 09/08, 191,000 on 09/09, 166,000 on

09/11, 163,000 on 09/12, 178,000 on 09/13, 220,000 on 09/14, and the patient dropped

down on 09/15 to 148,000, on 09/16 to 30,000, and on 09/16 to 21,000, and on 09/16

reported at 19,000.  The patient was given platelet transfusion by the Nephrology group

on 09/17.  The platelets went up to 131,000.  Hemoglobin had dropped down to 9.5.  White

count still remains 17,100 on 09/17.  Chemistry has shown a low sodium of 127, potassium

4.7, chloride 91, CO2 of 19, BUN 78, creatinine 2.99, glucose 193, bilirubin 2.8, SGOT

168, SGPT and 168, alkaline phosphatase 169.  Total protein low at 6.6, albumin low at

3, globulin slightly high at 3.6.  BNP was reported at 2140 on 09/05.  The uric acid was

reported high at 17.6.  The patient's bilirubin improved on 09/08 with a bilirubin of

1.4, SGOT and SGPT also improved at 41 and 89 respectively.  However, the albumin

remained low at 2.6.  The patient's LFTs had normalized with a slightly high bilirubin

of 1.6 on 09/10.  SGOT 32, SGPT 46, alkaline phosphatase became normal at 68. 

 

The patient had fluctuating levels of potassium, which have been replaced by Nephrology. 

 

Lactic acid was high at 26.9.  Coagulation; the patient's PTT on 09/05 was 28.6.  INR

was reported at 1.4 on 09/05.   on 09/05, on 09/06 had dropped down to 37.8.  The

patient's PTT on 09/09 was 30.1.  The patient's PTT on 09/10 was 171.79, on 09/10 at

101.7, on 09/10 at 102.9.  The patient had a PTT on 09/15, again was more than 200,

twice 197.8 on 09/16, 166.7 on 09/16.  On 09/15, the PTT was down to 82.5.  The patient

had an INR, which was reported at 4.2.  Fibrinogen level was done, which was low at 70

on 09/17.  The D-dimer was reported high at 20. 

 

Because of the low platelets, such a prolonged treatment with heparin, possibility of

HIT was raised.  HIT panel was suggested stat. 

 

Since the patient is in atrial fibrillation and needs anticoagulation, also has HIT

clinically, the patient was suggested to be on argatroban. 

 

The parameters of PTT and INR were given.  The patient's PTT went up to 145.8.

Subsequently, the argatroban infusion was withheld until the PTT came down to twice

normal. 

 

Meanwhile, the patient also had a fresh frozen plasma, which was given, which came to my

knowledge as the pathologist called me that this was a mismatch.  The patient is A

positive.  The patient was given O positive.  However, since this was done, there would

not be any treatment for the mismatch except to watch her creatinine and watch for

hemolysis.  Hydration was not possible as the patient is in renal failure, on dialysis,

a very close watch was kept on the patient's CBC.  I had suggested an LDH to look for

any hemolysis.  The LDH is high at 1185. 

 

A very close watch was also suggested to be kept on the hemoglobin.  The hemoglobin has

dropped to 9.5, it was 10.5 on 09/16. 

 

ASSESSMENT AND PLAN:  The patient's family has decided to transfer this patient to

[facility].  My clinical diagnosis with which I worked are: 

1. Chronic obstructive pulmonary disease.

2. Hyponatremia.

3. High creatinine.

4. High blood sugar.

5. High LFTs, which resolved later.

6. Congestive heart failure.

7. History of hypertension.

8. History of gastroesophageal reflux disease.

9. History of recent cellulitis of both legs.

10. Atrial fibrillation.

11. Urinary tract infection with Escherichia coli in the urine.

12. Leukemoid reaction.

13. Acquired thrombocytopenia, heparin-induced thrombocytopenia suspected.

14. Heparin-induced thrombocytopenia.

15. Hypoalbuminemia.

16. Hyperuricemia.

17. Hypocalcemia.

18. High PTT intermittently.

19. Fresh-frozen plasma mismatch transfusion. 

I will remain as a hematologist on the case.  I suggested heparin-induced

thrombocytopenia panel.  I suggested argatroban.  I suggested Solu-Medrol. 

 

Thank you very much for allowing me to participate in management of this patient.

Prognosis remains extremely poor with multiple comorbidities.  I have communicated this

with the patient's daughter and the rest of the family. 

 

 

 

 

______________________________

Che Giraldo MD

 

MAQ/MODL

D:  09/17/2019 19:22:13

T:  09/18/2019 01:32:54

Job #:  [id number]

 

cc:            MD Alistair Schrader MD Abraham S John, MD

## 2019-09-18 NOTE — NUR
Dr. Vieyra notified of lab results from SIRS/Sepsis. Dr. Vieyra stated he will come and see 
pt. will continue to monitor

## 2019-09-18 NOTE — NUR
Report received. Assumed care. Assessment done. See interventions. on Bipap.  Very 
lethargic. Withdraws to stimulation. IV Bicarb drip @ 50ml/hr & Amiodarone @ 0.5mg/hr.

## 2019-09-18 NOTE — NUR
Nutrition Intervention Note



RD Recommendation(s) for Physician: 

- Recommend post pyloric DHT placement and begin TF of Vital AF at 10 ml/hr, as pt is at 
risk for refeeding recommend slowly advancing by 10 ml q 8 hrs to goal of 55 ml/hr (to 
provide 1584 kcal and 99 mg protein) 

- Water flushes and fluid management per MD

- Please monitor BMP with Mg and Phos daily, replace low lytes as needed



Plan of Care: RD following, monitoring for tolerance and adequacy, TF rec's



Nutrition reason for involvement: follow up 



RD Assessment

9/18: Pt sleeping at time of visit on BiPAP and receiving HD tx. Pt discussed with RN during 
ICU rounds. Pt was to have DHT placement on Monday, which did not happen. Discussed need for 
DHT placement and initiation of EN today, RN awaiting return call from MD for plan. Transfer 
to OSH denied. TF rec's provided, no GI barriers. Will monitor and continue to follow. 



9/13  Pt was extubated and on nasal cannula. Pt was discussed during AM rounds. Pt failed 
bedside swallow; MBS pending. No pressor meds noted. No BM recorded since admission. Will 
continue to monitor and follow.  



(9/7/2019) Chart reviewed. Labs and meds reviewed. Initial encounter with patient. Pt denies 
any nausea, vomiting, diarrhea, nor has any difficulty chewing or swallowing. Pt denies any 
wt changes. Pt was eating well PTA. Good Po intake currently. Pt states that she is lactose 
intolerant

Principal Problems/Diagnoses: UTI, atrial fibrillation, Elevated liver function test



PMH: Hypertension, hyperlipidemia, congestive heart failure, atrial fibrillation, 
gastroesophageal reflux disease, history of deep venous thrombosis, left cerebral artery 
aneurysm



GI: abdomen soft, non-tender, round, no BM recorded  



Skin: no pressure wound noted 



Labs: 9/18: Na 137, K 3.9, BUN 16, Cr 1.38, Gluc 105, Phos 4.7 

9/13 - K 3.2 L, Creatinine 1.28 H, Glucose 150 H, Ca 7.9 L 



Meds: solumedrol, IV abx, dilantin IV, protonix, zofran 



Ht: 62in

Wt: 147lb

BMI: 26.9kg/m2

IBW: 110lb +/- 10%



Malnutrition Evaluation (9/7/2019)

The patient does not meet criteria for a specified degree of malnutrition at this time. Will 
re-evaluate at follow-up as appropriate. 



Nutrition Prescription (Diet Order): NPO 



Estimated Nutritional Needs (off vent):

Calories: 1340  1675kcal(20-25kcal/kg/d) Weight used: CBW 

Protein: 84 -100g (1.2-1.5g/kg/d) Weight used: CBW



Diet Adequacy:

Not meeting calorie needs, Not meeting protein needs



Diet Education Needs Assessment:

Diet education not indicated.



Nutrition Care Level: High 



Nutrition Diagnosis: Inadequate oral intake related to current medical status as evidenced 
by NPO x 10 days. 



Goal: Patient will meet % of estimated needs by follow up 



Progress: Not Progressing



Interventions:

Composition, Rate, Route



Monitoring/Evaluation:

Total energy intake, Total protein intake, Formula/Solution, IVF, Weight change



Signed: Ritika Vivas RD, LD, CNSC

## 2019-09-18 NOTE — NUR
ST Note:



Phil Le RN, pt not appropriate for any intervention of swallow safety.  Will defer for 
today.  Consider alternative means of nutrition/hydration due to length of time without 
nutrition.

## 2019-09-18 NOTE — DIAGNOSTIC IMAGING REPORT
EXAM:  CHEST SINGLE (PORTABLE)



DATE: 9/18/2019 8:10 AM  



INDICATION: CHF  



COMPARISON: 9/17/2019



FINDINGS:

Right-sided IJ central line and left-sided IJ non-tunneled dialysis catheter

identified in stable position.



The trachea is midline. There is persistent mild prominence of the interstitium

which can be seen in the setting of edema and is similar to the prior

examination. There are mild bibasilar opacities suggestive of atelectasis.

There is blunting of the costophrenic angles and trace pleural effusions are

possible. There is no evidence for new large focal consolidation or

pneumothorax.



The cardiomediastinal silhouette is stable in appearance. No acute osseous

abnormalities identified.





IMPRESSION:



Unchanged findings suggestive of pulmonary edema. 



No significant interval change from 9/17/2019.



Signed by: Dr. Jorge Alberto Lee MD on 9/18/2019 8:51 AM

## 2019-09-18 NOTE — PROGRESS NOTE
DATE:  09/18/2019  

 

SUBJECTIVE:  Ms. Yang today is in Intensive Care, remains on BiPAP.  The patient

seems little bit more worse now than before. 

 

The patient was seen and examined.  Chart reviewed.  Apparently, the patient received

fresh frozen plasma on September 16, 2019, which was mismatch.  The patient was given O

positive while she is A positive.  The patient is being followed by Dr. Giraldo.  The

patient had a long discussion with him.  I also discussed the case with Dr. Rossi and

Dr. Porras as well as the family.  The concern today that the patient became septic.

Sepsis alert was started.  I was asked to see her.  She is on BiPAP.  Apparently, she

was more better earlier.  The patient is lethargic.  Her family at the bedside. 

 

LABORATORY DATA:  Reviewed.  There is really nothing new.  Her blood cultures from

September 17, 2019, is negative.  Urine on September 14, 2019, showing Candida albicans

and from December 5th had ESBL.  Her laboratory data showed WBC of 13.7, hemoglobin of 9

today, it was 10.5 on 16th and it was 12 before that.  Her platelet count is 84 today,

it was 19 on September 16th.  Her sodium 137, potassium 3.9, creatinine of 1.38.  Lactic

acid 33 today.  Bilirubin 5.5, AST 1366, her .  The patient had a chest x-ray

today showed pulmonary edema.  She had ultrasound of abdomen on 09/5/2019 which showed

no gallbladder thickening, stones, or fluid. 

 

REVIEW OF SYSTEMS:

She is lethargic.

 

PHYSICAL EXAMINATION:

As mentioned above. 

VITAL SIGNS:  Temperature 97.9, heart rate 92, respirations 20. 

HEENT:  She is normocephalic, little bit pale and cachectic. 

NECK:  Supple. 

CHEST:  Clear. 

HEART:  S1 and S2.  No S3, S4, or murmur. 

ABDOMEN:  Soft.  Bowel sounds present.  No tenderness. 

EXTREMITIES:  No edema. 

SKIN:  No rash.

IMPRESSION:  

1. Sepsis, concerned about infection versus systemic inflammatory response syndrome.

Blood cultures are pending.  Check amylase, lipase.  I would like to do CAT scan of

abdomen and pelvis, however, the radiologist is uncomfortable to take the patient to

Radiology.  She is on BiPAP.  We will recommend intubation and also would like to do

contrast, she cannot swallow, so we had to put NG tube or Dobhoff.  However, Dr. Giraldo said we cannot do it as she is at high risk for bleed. 

2. __________ options workup for sepsis.  I am going to get ultrasound of the abdomen

stat and then see if that will help.  I am going to change coverage to cefepime and

Flagyl and Diflucan.  Follow with her blood cultures.  I am concerned about C. difficile

colitis versus other. 

3. Her bilirubin and liver enzymes are elevated.  Her LDH was also elevated to 1185,

could be for hemolysis.  She is on methylprednisolone of 60 q.8 h.  We will change her

to cefepime 

and metronidazole and then reassess.  Discussed with her daughter who is a nurse at

length.  Other medical problems as above. 

 

 

 

 

______________________________

MD JEROD Oden/DAISY

D:  09/18/2019 15:34:22

T:  09/18/2019 17:05:20

Job #:  [id number]

## 2019-09-18 NOTE — NUR
Called and spoke to Rani at [facility] [phone]. She states clinicals 
have been received. Pt is financially cleared. Pending clinical review, bed availability, 
and accepting MD.

## 2019-09-18 NOTE — NUR
CALL RECEIVED FROM [facility] AFTER REVIEWING PT'S MEDICAL RECORD THE PT WAS 
DECLINED.

REASON:  NOT A REASON FOR TRANSFER.

-------------------------------------------------------------------------------

Addendum: 09/18/19 at 1124 by Diane Woods CM

-------------------------------------------------------------------------------

DR CARVER WAS NOTIFIED.

-------------------------------------------------------------------------------

Addendum: 09/18/19 at 1127 by Diane Woods CM

-------------------------------------------------------------------------------

VALENCIA WALKER NOTIFIED ALSO.

## 2019-09-18 NOTE — DIAGNOSTIC IMAGING REPORT
EXAM:  US ABDOMEN COMPLETE



DATE: 9/18/2019 3:19 PM  



INDICATION: Abdominal pain  



COMPARISON: None



FINDINGS:

Please note the examination is limited secondary to patient's inability to

comply with breath holding or optimal positioning.



The visualized pancreas appears unremarkable.



The liver is normal in size measuring 12.7 cm in length. The hepatic parenchyma

is homogeneous without evidence for focal abnormality. The main portal vein is

patent with antegrade flow and diameter of 0.8 cm, within normal limits.



The gallbladder is unremarkable. There is no evidence for cholelithiasis,

gallbladder wall thickening, or pericholecystic fluid. There is no intra or

extra hepatic biliary ductal dilatation. The common bile duct measures 4 mm.

Sonographic Veliz's sign is negative.



The spleen is normal in size measuring 9.6 cm in length and demonstrates an

unremarkable sonographic appearance.



The right kidney appears mildly decreased in size which may be secondary to

suboptimal evaluation with normal cortical thickness and echogenicity. The left

kidney is normal in size measuring 9.6 cm in length with normal cortical

thickness and echogenicity. There is no evidence for solid renal mass,

hydronephrosis, or shadowing calculi.



The visual as portions of the IVC and aorta are within normal limits.



There is no ascites present.





IMPRESSION:



Unremarkable abdominal ultrasound examination.



Signed by: Dr. Jorge Alberto Lee MD on 9/18/2019 5:00 PM

## 2019-09-19 NOTE — PROGRESS NOTE
DATE:  09/18/2019

 

Cardiology Progress Note 

 

SUBJECTIVE:  The patient remains altered.  She is on BiPAP.

 

OBJECTIVE:  VITAL SIGNS:  Temperature 97.5 degrees, pulse 96, respiratory rate 
17, blood

pressure 111/79, oxygen saturation 100% on BiPAP. 

GENERAL:  Chronically ill-appearing, elderly frail woman, in no acute distress,

confused, on BiPAP. 

LUNGS:  Decreased breath sounds at the bases.  No wheeze or crackles. 

CARDIOVASCULAR:  Irregularly irregular.  No murmur. 

ABDOMEN:  Soft.  Nontender. 

EXTREMITIES:  Edematous.

 

CARDIAC MEDICATIONS:  Metoprolol tartrate 5 mg IV q.4 hours, amiodarone drip.

 

LABORATORY DATA:  WBC 13.7, hemoglobin 9, hematocrit 27.4, platelets 84.  Sodium
137,

potassium 3.9, chloride 96, CO2 26, BUN 16, creatinine 1.38.  Lactic acid 33, 
AST 1,292,

.  INR 2.64.  HIT panel pending. 

 

TELEMETRY:  Atrial fibrillation.

 

IMPRESSION:  

1. Atrial fibrillation.

2. Acute hypoxic respiratory failure on BiPAP.

3. Acute systolic heart failure, LVEF 35% to 40%.

4. Elevated troponin.

5. Ischemic infarct on MRI of the brain.

6. Acute on chronic kidney disease, now initiated on hemodialysis.

7. Thrombocytopenia.

8. Coagulopathy with supratherapeutic INR.

9. Elevated LFTs.

10. Hyperlipidemia.

11. Mitral regurgitation.

 

RECOMMENDATIONS:  The patient's LFTs are stable to improved.  We will continue

amiodarone drip at this time.  Continue IV metoprolol for rate control.  We will
defer

decision regarding anticoagulation to Hematology given suspected HIT and 
coagulopathy.

No p.o. medications at this time due to coagulopathy.  Monitor the patient 
closely on

telemetry.  If the patient improves clinically, she will need ischemic 
evaluation prior

to discharge.  Continue supportive care.  Antibiotics per Infectious Disease.  
Her

prognosis is guarded. 

 

Thank you for this consult.  We will continue to follow.

 

 

 

 

______________________________

Cecilia Vital MD

 

ABS/MODL

D:  09/18/2019 21:33:37

T:  09/19/2019 01:23:27

Job #:  [id number]

 

MTDD

## 2019-09-19 NOTE — PROGRESS NOTE
DATE:  09/18/2019  

 

SUBJECTIVE:  The patient seemed to do a little bit better overnight, was still requiring

BiPAP, used to help with her oxygenation.  Hemodynamically appeared to be stable.

Family states that she had a relatively quiet night with good sleep. 

 

OBJECTIVE:  VITAL SIGNS:  Currently, her temperature is 98.6, pulse 94, blood pressure

136/74, and sats 98% on BiPAP. 

GENERAL:  She is able to open her eyes to voice stimuli, but not following commands. 

NECK:  No JVD on the neck exam. 

CARDIOVASCULAR:  Irregularly irregular. 

LUNGS:  Decreased breath sounds bilaterally, but no rhonchi or wheezing. 

ABDOMEN:  Soft.  Good bowel sounds.  No peritoneal signs. 

EXTREMITIES:  No clubbing or cyanosis.

 

ASSESSMENT AND PLAN:  

1. Toxic encephalopathy.  We will continue to monitor for any signs or reasons.

2. Elevated liver function tests.  We will check a CMP this morning.

3. Possible heparin-induced thrombocytopenia.  We will continue with current care per

Dr. Giraldo and check a CBC. 

4. Atrial fibrillation.  We will continue with current care per Cardiology.

5. Acute systolic heart failure.  We will continue with dialysis to help prolong her

volume since increased Bumex was worsening her kidney function. 

6. Acute renal failure.  We will continue to monitor and check a CMP, and Renal is

following the patient. 

 

DISPOSITION:  I had a long talk with the daughters and the goals to try to get the

patient transferred downtown today.  Please see hospital chart for full details. 

 

 

 

 

______________________________

MD ZURDO Trinh/DAISY

D:  09/18/2019 19:24:37

T:  09/19/2019 02:35:37

Job #:  [id number]

## 2019-09-22 NOTE — DISCHARGE SUMMARY
DISCHARGE DIAGNOSES:  

1. Acute systolic heart failure.

2. Atrial fibrillation with rapid ventricular response.

3. Seizure, secondary to cerebrovascular accident.

4. Left-sided cerebrovascular accident.

5. Thrombocytopenia, secondary to possible heparin-induced thrombocytopenia.

6. Encephalopathy.

7. Acute respiratory failure with ventilatory support.

 

HISTORY OF PRESENT ILLNESS AND HOSPITAL COURSE:  See hospital chart for full details.

The patient is a lady, who was ultimately [place] to [facility] for her

local care, who presented with lower extremity swelling for about a month prior to

admission, where then she came in and she was noticed to have new onset atrial

fibrillation with rapid ventricular response.  Echo showed a new onset of acute systolic

heart failure with an ejection fraction around 20%, as well as increased liver function

tests and acute renal failure.  She was brought in the ICU, placed on rate controlled

and diuretics was seen by Cardiology, where she initially made improvement where liver

tests went back to normal.  Her kidney function went back to normal, but one morning the

patient all of sudden after performing well with therapy and having  __________ she had

new onset of seizure, which required inhibition with ventilatory support for airway

protection, where cranial CT at that time was negative.  MRI performed did show evidence

of a small stroke on the left-sided MCA area.  She was seen by Neurology as well.  The

patient seem to make initial improvements there.  She was slow to wean from the

ventilator due to her pulmonary edema, but she was able to be successfully extubated and

use BiPAP periodically, but then one morning after rounds, the patient has appeared to

be coming more mental status changes and then also had a drop in her platelet count from

normal all the way down to the 30s for unexplained reasons, so heparin was stopped at

that time.  She had appropriate laboratory data done to rule out any type of

heparin-induced thrombocytopenia or DIC.  She was consulted with Dr. Giraldo.  There

was an order for some fresh frozen plasma and platelets, but I got a call later that

night there was a clerical error in the lab that the patient did receive mismatched

blood products, where she is A positive, but received O positive blood products.

Administration became involved at that point, the patient was seen by Dr. Giraldo at

the bedside, who helped manage the situation with the patient, did not appear to have

any negative affects from the mismatch blood products, but then __________ creatinine

due to a recent episode a few days prior had some hypotension, most likely causing ATN.

Dr. Porras made the family aware that this is most likely going to happen, which it did,

so she was then had a catheter for dialysis placed and had a few days worth of dialysis

with removal of liters of fluid since increasing her Bumex was only worsening her

kidneys and this subsequently helped with her pulmonary status, but due to the

complexity of the patient and need of high-level care, the patient was then transferred

[place] to [facility] per the family wishes. 

 

Please see hospital chart for full details since this discharge summary is not all

encompassing. 

 

 

 

 

______________________________

MD ZURDO Trinh/DAISY

D:  09/22/2019 11:16:28

T:  09/22/2019 17:39:52

Job #:  [id number]

## 2022-07-25 NOTE — NUR
Pt. Resting in bed at this time. In view of 1:1 sitter. VSS.    Restarted [id number] @ 550 units/hr. Son at bedside. Questions answered.

## 2024-12-16 NOTE — NUR
Report received. Assumed care. Assessment done. See interventions.  Bumex one dose given 
over 2 minutes.  Orally intubated with 7.5FR ETT secured at 24cm at the lip. Vent settings: 
, FIO2 35%, PRC 12 & PEEP 5cm. IVs: Amiodarone at 0.5mg/hr, Heparin @ 700 units/hr, & 
D5NS @ 70ml/hr. med Primary Thania Mccullough, NP Dr. Brothers Dr. Christie